# Patient Record
Sex: MALE | Race: WHITE | NOT HISPANIC OR LATINO | URBAN - METROPOLITAN AREA
[De-identification: names, ages, dates, MRNs, and addresses within clinical notes are randomized per-mention and may not be internally consistent; named-entity substitution may affect disease eponyms.]

---

## 2017-09-10 ENCOUNTER — INPATIENT (INPATIENT)
Facility: HOSPITAL | Age: 37
LOS: 2 days | Discharge: HOME | DRG: 896 | End: 2017-09-13
Attending: INTERNAL MEDICINE | Admitting: HOSPITALIST
Payer: MEDICAID

## 2017-09-10 VITALS
RESPIRATION RATE: 18 BRPM | HEART RATE: 81 BPM | DIASTOLIC BLOOD PRESSURE: 104 MMHG | OXYGEN SATURATION: 95 % | TEMPERATURE: 99 F | SYSTOLIC BLOOD PRESSURE: 166 MMHG

## 2017-09-10 LAB
ALBUMIN SERPL ELPH-MCNC: 5.2 G/DL — HIGH (ref 3.3–5)
ALP SERPL-CCNC: 40 U/L — SIGNIFICANT CHANGE UP (ref 40–120)
ALT FLD-CCNC: 19 U/L RC — SIGNIFICANT CHANGE UP (ref 10–45)
ANION GAP SERPL CALC-SCNC: 17 MMOL/L — SIGNIFICANT CHANGE UP (ref 5–17)
APPEARANCE UR: CLEAR — SIGNIFICANT CHANGE UP
AST SERPL-CCNC: 23 U/L — SIGNIFICANT CHANGE UP (ref 10–40)
BASOPHILS # BLD AUTO: 0.1 K/UL — SIGNIFICANT CHANGE UP (ref 0–0.2)
BASOPHILS NFR BLD AUTO: 0.7 % — SIGNIFICANT CHANGE UP (ref 0–2)
BILIRUB SERPL-MCNC: 0.8 MG/DL — SIGNIFICANT CHANGE UP (ref 0.2–1.2)
BILIRUB UR-MCNC: NEGATIVE — SIGNIFICANT CHANGE UP
BUN SERPL-MCNC: 13 MG/DL — SIGNIFICANT CHANGE UP (ref 7–23)
CALCIUM SERPL-MCNC: 9.8 MG/DL — SIGNIFICANT CHANGE UP (ref 8.4–10.5)
CHLORIDE SERPL-SCNC: 100 MMOL/L — SIGNIFICANT CHANGE UP (ref 96–108)
CO2 SERPL-SCNC: 25 MMOL/L — SIGNIFICANT CHANGE UP (ref 22–31)
COLOR SPEC: YELLOW — SIGNIFICANT CHANGE UP
CREAT SERPL-MCNC: 0.86 MG/DL — SIGNIFICANT CHANGE UP (ref 0.5–1.3)
DIFF PNL FLD: NEGATIVE — SIGNIFICANT CHANGE UP
EOSINOPHIL # BLD AUTO: 0 K/UL — SIGNIFICANT CHANGE UP (ref 0–0.5)
EOSINOPHIL NFR BLD AUTO: 0.1 % — SIGNIFICANT CHANGE UP (ref 0–6)
ETHANOL SERPL-MCNC: SIGNIFICANT CHANGE UP MG/DL (ref 0–10)
GAS PNL BLDV: SIGNIFICANT CHANGE UP
GLUCOSE SERPL-MCNC: 112 MG/DL — HIGH (ref 70–99)
GLUCOSE UR QL: NEGATIVE — SIGNIFICANT CHANGE UP
HCT VFR BLD CALC: 44.6 % — SIGNIFICANT CHANGE UP (ref 39–50)
HGB BLD-MCNC: 15.5 G/DL — SIGNIFICANT CHANGE UP (ref 13–17)
KETONES UR-MCNC: ABNORMAL
LEUKOCYTE ESTERASE UR-ACNC: NEGATIVE — SIGNIFICANT CHANGE UP
LYMPHOCYTES # BLD AUTO: 1.3 K/UL — SIGNIFICANT CHANGE UP (ref 1–3.3)
LYMPHOCYTES # BLD AUTO: 14.8 % — SIGNIFICANT CHANGE UP (ref 13–44)
MCHC RBC-ENTMCNC: 31 PG — SIGNIFICANT CHANGE UP (ref 27–34)
MCHC RBC-ENTMCNC: 34.7 GM/DL — SIGNIFICANT CHANGE UP (ref 32–36)
MCV RBC AUTO: 89.4 FL — SIGNIFICANT CHANGE UP (ref 80–100)
MONOCYTES # BLD AUTO: 0.7 K/UL — SIGNIFICANT CHANGE UP (ref 0–0.9)
MONOCYTES NFR BLD AUTO: 8.4 % — SIGNIFICANT CHANGE UP (ref 2–14)
NEUTROPHILS # BLD AUTO: 6.8 K/UL — SIGNIFICANT CHANGE UP (ref 1.8–7.4)
NEUTROPHILS NFR BLD AUTO: 76 % — SIGNIFICANT CHANGE UP (ref 43–77)
NITRITE UR-MCNC: NEGATIVE — SIGNIFICANT CHANGE UP
PCP SPEC-MCNC: SIGNIFICANT CHANGE UP
PH UR: 6 — SIGNIFICANT CHANGE UP (ref 5–8)
PLATELET # BLD AUTO: 196 K/UL — SIGNIFICANT CHANGE UP (ref 150–400)
POTASSIUM SERPL-MCNC: 3.8 MMOL/L — SIGNIFICANT CHANGE UP (ref 3.5–5.3)
POTASSIUM SERPL-SCNC: 3.8 MMOL/L — SIGNIFICANT CHANGE UP (ref 3.5–5.3)
PROT SERPL-MCNC: 8.4 G/DL — HIGH (ref 6–8.3)
PROT UR-MCNC: 30 MG/DL
RBC # BLD: 5 M/UL — SIGNIFICANT CHANGE UP (ref 4.2–5.8)
RBC # FLD: 12.4 % — SIGNIFICANT CHANGE UP (ref 10.3–14.5)
RBC CASTS # UR COMP ASSIST: SIGNIFICANT CHANGE UP /HPF (ref 0–2)
SALICYLATES SERPL-MCNC: 2.3 MG/DL — LOW (ref 15–30)
SODIUM SERPL-SCNC: 142 MMOL/L — SIGNIFICANT CHANGE UP (ref 135–145)
SP GR SPEC: 1.03 — HIGH (ref 1.01–1.02)
UROBILINOGEN FLD QL: 2
WBC # BLD: 8.9 K/UL — SIGNIFICANT CHANGE UP (ref 3.8–10.5)
WBC # FLD AUTO: 8.9 K/UL — SIGNIFICANT CHANGE UP (ref 3.8–10.5)
WBC UR QL: SIGNIFICANT CHANGE UP /HPF (ref 0–5)

## 2017-09-10 PROCEDURE — 99285 EMERGENCY DEPT VISIT HI MDM: CPT | Mod: 25

## 2017-09-10 PROCEDURE — 71020: CPT | Mod: 26

## 2017-09-10 PROCEDURE — 70450 CT HEAD/BRAIN W/O DYE: CPT | Mod: 26

## 2017-09-10 PROCEDURE — 93010 ELECTROCARDIOGRAM REPORT: CPT

## 2017-09-10 RX ORDER — HALOPERIDOL DECANOATE 100 MG/ML
5 INJECTION INTRAMUSCULAR ONCE
Qty: 0 | Refills: 0 | Status: COMPLETED | OUTPATIENT
Start: 2017-09-10 | End: 2017-09-10

## 2017-09-10 RX ORDER — HALOPERIDOL DECANOATE 100 MG/ML
5 INJECTION INTRAMUSCULAR ONCE
Qty: 0 | Refills: 0 | Status: DISCONTINUED | OUTPATIENT
Start: 2017-09-10 | End: 2017-09-10

## 2017-09-10 RX ORDER — SODIUM CHLORIDE 9 MG/ML
1000 INJECTION INTRAMUSCULAR; INTRAVENOUS; SUBCUTANEOUS ONCE
Qty: 0 | Refills: 0 | Status: COMPLETED | OUTPATIENT
Start: 2017-09-10 | End: 2017-09-10

## 2017-09-10 RX ORDER — VANCOMYCIN HCL 1 G
1000 VIAL (EA) INTRAVENOUS ONCE
Qty: 0 | Refills: 0 | Status: COMPLETED | OUTPATIENT
Start: 2017-09-10 | End: 2017-09-11

## 2017-09-10 RX ORDER — AMPICILLIN TRIHYDRATE 250 MG
2 CAPSULE ORAL ONCE
Qty: 0 | Refills: 0 | Status: COMPLETED | OUTPATIENT
Start: 2017-09-10 | End: 2017-09-10

## 2017-09-10 RX ORDER — CEFTRIAXONE 500 MG/1
2 INJECTION, POWDER, FOR SOLUTION INTRAMUSCULAR; INTRAVENOUS ONCE
Qty: 0 | Refills: 0 | Status: COMPLETED | OUTPATIENT
Start: 2017-09-10 | End: 2017-09-10

## 2017-09-10 RX ADMIN — SODIUM CHLORIDE 3000 MILLILITER(S): 9 INJECTION INTRAMUSCULAR; INTRAVENOUS; SUBCUTANEOUS at 20:30

## 2017-09-10 RX ADMIN — CEFTRIAXONE 100 GRAM(S): 500 INJECTION, POWDER, FOR SOLUTION INTRAMUSCULAR; INTRAVENOUS at 23:54

## 2017-09-10 RX ADMIN — Medication 2 MILLIGRAM(S): at 22:10

## 2017-09-10 RX ADMIN — Medication 2 MILLIGRAM(S): at 22:00

## 2017-09-10 RX ADMIN — HALOPERIDOL DECANOATE 5 MILLIGRAM(S): 100 INJECTION INTRAMUSCULAR at 22:00

## 2017-09-10 RX ADMIN — HALOPERIDOL DECANOATE 5 MILLIGRAM(S): 100 INJECTION INTRAMUSCULAR at 22:10

## 2017-09-10 NOTE — ED ADULT TRIAGE NOTE - CHIEF COMPLAINT QUOTE
As per mom, pt has been acting differently and hallucinating. Pt admits multi drug use, last use was this morning, has been having problem for about 10years

## 2017-09-10 NOTE — ED ADULT NURSE NOTE - CHIEF COMPLAINT QUOTE
As per mom, pt has been acting differently and hallucinating. Pt admits multi drug use, last use was this morning, has been having problem for about 10years  NO SI/HI

## 2017-09-10 NOTE — ED ADULT NURSE REASSESSMENT NOTE - NS ED NURSE REASSESS COMMENT FT1
Patient has episode of yelling and screaming. Flight of thoughts. Total od 4mg ativan and 10mg haldol administered in order to calm down patient. Patient placed on end tidal CO2. Patient completely undressed and placed on 1:1. Patient has episode of yelling and screaming. Flight of thoughts. Staff attempt to verbally deescalate situation, attempt unsuccessful. Patient become agitated and start to get out of bed.  MD aware verbal deescalation unsuccessful, order medication. First 5mg haldol and 2mg ativan administered, behavior unchanged. Second dose of 5mg haldol and 2 mg ativan administered. Patient slowly calm down. Patient placed on end tidal CO2. Patient completely undressed and placed on 1:1.

## 2017-09-10 NOTE — ED ADULT NURSE NOTE - OBJECTIVE STATEMENT
37y male c/o AMS. Hx of heroin use. Patient has been taking saboxone and snorting heroin he has been buying off the street. Patient last used at 10am. Patient speech jumbled. Denies ETOH use. States hallucinations and tremors. Patient diaphoretic. Sober 2 weeks. 37y male c/o AMS. A&Ox2, neuro intact, VSS. Hx of heroin use. Patient has been taking saboxone and snorting heroin he has been buying off the street. Patient last used at 10am. Patient speech jumbled, cannot maintain direction of thought. Denies ETOH use. States auditory hallucinations and tremors. Sober 2 weeks. Denies SI and HI. Upon arrival patient is diaphoretic and intermittently tachycardic. Denies chest pain, sob, fever/chills, n/v/d.

## 2017-09-10 NOTE — ED PROVIDER NOTE - MEDICAL DECISION MAKING DETAILS
37 M with heroin abuse, brought in by family for hallucinations today. VSS. Aox2. Likely substance abuse related but will check labs, tox, ct head, ekg, iv hydration and reassess

## 2017-09-10 NOTE — ED PROVIDER NOTE - PROGRESS NOTE DETAILS
morad: patient suddenly became agitated, screaming and hallucinating. Ativan 2, haldol 5 ordered, with little response. Another 2 and 5 ordered no source for infection rectal 100.2 will fu with LP admit MoraD: attempted LP, patient moving and agitated, will attempt with sedation

## 2017-09-10 NOTE — ED PROVIDER NOTE - OBJECTIVE STATEMENT
37 M, h/o drug use in the past (heroin, suboxone, had not used for a few months) brought in by family for hallucinations and tangential talk today. Patient says used heroin this morning, denies other ingestants. denies SI/HI. Denies HA, Cp, SOB, abd pain, n,v,d. Currently denying auditory / visual hallucinations.

## 2017-09-10 NOTE — ED PROVIDER NOTE - ATTENDING CONTRIBUTION TO CARE
Dr. Sage : I have personally seen and examined this patient at the bedside. I have fully participated in the care of this patient. I have reviewed all pertinent clinical information, including history, physical exam, plan and the Resident's note and agree except as noted.   38yo M hx of drug abuse p/w AMS as per family. pt admits to using heroin daily last used 14hrs ago. denies other drugs no etoh intake. notes that has had more frequent urination today. no head trauma   pt notes that he has been trying to taper himself off of heroin and has been using less than usual.  Denies f/c/n/v/cp/sob/palpitations/cough/abd.pain/d/c/dysuria/hematuria. no sick contacts/recent travel.    PE:  head; atraumatic normocephalic  eyes: perrla eomi  Heart: rrr s1s2  lungs: ctab  abd: soft, nt nd + bs no rebound/guarding no cva ttp  le: no swelling no calf ttp  back: no midline cervical/thoracic/lumbar ttp  neuro: aao x 1 (does not know the date thinks its July) cn ii-xii intact    -->ams--heroin withdrawal vs infectious iteology vs electrolyte imbalance --vs drugs will fu labs ct head to ro bleed; fluids;--reassess ; detox

## 2017-09-11 DIAGNOSIS — G93.40 ENCEPHALOPATHY, UNSPECIFIED: ICD-10-CM

## 2017-09-11 DIAGNOSIS — R50.9 FEVER, UNSPECIFIED: ICD-10-CM

## 2017-09-11 DIAGNOSIS — F19.90 OTHER PSYCHOACTIVE SUBSTANCE USE, UNSPECIFIED, UNCOMPLICATED: ICD-10-CM

## 2017-09-11 DIAGNOSIS — Z29.9 ENCOUNTER FOR PROPHYLACTIC MEASURES, UNSPECIFIED: ICD-10-CM

## 2017-09-11 DIAGNOSIS — R41.82 ALTERED MENTAL STATUS, UNSPECIFIED: ICD-10-CM

## 2017-09-11 DIAGNOSIS — R41.0 DISORIENTATION, UNSPECIFIED: ICD-10-CM

## 2017-09-11 LAB
ALBUMIN SERPL ELPH-MCNC: 4 G/DL — SIGNIFICANT CHANGE UP (ref 3.3–5)
ALP SERPL-CCNC: 30 U/L — LOW (ref 40–120)
ALT FLD-CCNC: 17 U/L RC — SIGNIFICANT CHANGE UP (ref 10–45)
ANION GAP SERPL CALC-SCNC: 12 MMOL/L — SIGNIFICANT CHANGE UP (ref 5–17)
APPEARANCE CSF: CLEAR — SIGNIFICANT CHANGE UP
APPEARANCE SPUN FLD: COLORLESS — SIGNIFICANT CHANGE UP
APTT BLD: 29 SEC — SIGNIFICANT CHANGE UP (ref 27.5–37.4)
AST SERPL-CCNC: 24 U/L — SIGNIFICANT CHANGE UP (ref 10–40)
BASOPHILS # BLD AUTO: 0 K/UL — SIGNIFICANT CHANGE UP (ref 0–0.2)
BASOPHILS NFR BLD AUTO: 0.6 % — SIGNIFICANT CHANGE UP (ref 0–2)
BILIRUB SERPL-MCNC: 0.8 MG/DL — SIGNIFICANT CHANGE UP (ref 0.2–1.2)
BUN SERPL-MCNC: 10 MG/DL — SIGNIFICANT CHANGE UP (ref 7–23)
CALCIUM SERPL-MCNC: 8.6 MG/DL — SIGNIFICANT CHANGE UP (ref 8.4–10.5)
CHLORIDE SERPL-SCNC: 106 MMOL/L — SIGNIFICANT CHANGE UP (ref 96–108)
CK SERPL-CCNC: 275 U/L — HIGH (ref 30–200)
CO2 SERPL-SCNC: 23 MMOL/L — SIGNIFICANT CHANGE UP (ref 22–31)
COLOR CSF: SIGNIFICANT CHANGE UP
CREAT SERPL-MCNC: 0.6 MG/DL — SIGNIFICANT CHANGE UP (ref 0.5–1.3)
EOSINOPHIL # BLD AUTO: 0 K/UL — SIGNIFICANT CHANGE UP (ref 0–0.5)
EOSINOPHIL NFR BLD AUTO: 0.3 % — SIGNIFICANT CHANGE UP (ref 0–6)
GLUCOSE CSF-MCNC: 81 MG/DL — HIGH (ref 40–70)
GLUCOSE SERPL-MCNC: 122 MG/DL — HIGH (ref 70–99)
GRAM STN FLD: SIGNIFICANT CHANGE UP
HCT VFR BLD CALC: 37.1 % — LOW (ref 39–50)
HGB BLD-MCNC: 13 G/DL — SIGNIFICANT CHANGE UP (ref 13–17)
HIV 1 & 2 AB SERPL IA.RAPID: SIGNIFICANT CHANGE UP
INR BLD: 1.24 RATIO — HIGH (ref 0.88–1.16)
LDH CSF L TO P-CCNC: 26 U/L — SIGNIFICANT CHANGE UP
LDH FLD-CCNC: 26 U/L — SIGNIFICANT CHANGE UP
LYMPHOCYTES # BLD AUTO: 1.5 K/UL — SIGNIFICANT CHANGE UP (ref 1–3.3)
LYMPHOCYTES # BLD AUTO: 20.5 % — SIGNIFICANT CHANGE UP (ref 13–44)
LYMPHOCYTES # CSF: 71 % — SIGNIFICANT CHANGE UP (ref 40–80)
MAGNESIUM SERPL-MCNC: 2.1 MG/DL — SIGNIFICANT CHANGE UP (ref 1.6–2.6)
MCHC RBC-ENTMCNC: 31.4 PG — SIGNIFICANT CHANGE UP (ref 27–34)
MCHC RBC-ENTMCNC: 35 GM/DL — SIGNIFICANT CHANGE UP (ref 32–36)
MCV RBC AUTO: 89.6 FL — SIGNIFICANT CHANGE UP (ref 80–100)
MONOCYTES # BLD AUTO: 0.8 K/UL — SIGNIFICANT CHANGE UP (ref 0–0.9)
MONOCYTES NFR BLD AUTO: 11.5 % — SIGNIFICANT CHANGE UP (ref 2–14)
MONOS+MACROS NFR CSF: 29 % — SIGNIFICANT CHANGE UP (ref 15–45)
NEUTROPHILS # BLD AUTO: 4.8 K/UL — SIGNIFICANT CHANGE UP (ref 1.8–7.4)
NEUTROPHILS # CSF: 0 % — SIGNIFICANT CHANGE UP (ref 0–6)
NEUTROPHILS NFR BLD AUTO: 67.1 % — SIGNIFICANT CHANGE UP (ref 43–77)
NRBC NFR CSF: 3 /UL — SIGNIFICANT CHANGE UP (ref 0–5)
PHOSPHATE SERPL-MCNC: 3.3 MG/DL — SIGNIFICANT CHANGE UP (ref 2.5–4.5)
PLATELET # BLD AUTO: 149 K/UL — LOW (ref 150–400)
POTASSIUM SERPL-MCNC: 3.5 MMOL/L — SIGNIFICANT CHANGE UP (ref 3.5–5.3)
POTASSIUM SERPL-SCNC: 3.5 MMOL/L — SIGNIFICANT CHANGE UP (ref 3.5–5.3)
PROT CSF-MCNC: 38 MG/DL — SIGNIFICANT CHANGE UP (ref 15–45)
PROT SERPL-MCNC: 6.3 G/DL — SIGNIFICANT CHANGE UP (ref 6–8.3)
PROTHROM AB SERPL-ACNC: 13.5 SEC — HIGH (ref 9.8–12.7)
RBC # BLD: 4.14 M/UL — LOW (ref 4.2–5.8)
RBC # CSF: 0 /UL — SIGNIFICANT CHANGE UP (ref 0–0)
RBC # FLD: 12.3 % — SIGNIFICANT CHANGE UP (ref 10.3–14.5)
SODIUM SERPL-SCNC: 141 MMOL/L — SIGNIFICANT CHANGE UP (ref 135–145)
SPECIMEN SOURCE: SIGNIFICANT CHANGE UP
TUBE TYPE: SIGNIFICANT CHANGE UP
WBC # BLD: 7.2 K/UL — SIGNIFICANT CHANGE UP (ref 3.8–10.5)
WBC # FLD AUTO: 7.2 K/UL — SIGNIFICANT CHANGE UP (ref 3.8–10.5)

## 2017-09-11 PROCEDURE — 12345: CPT | Mod: GC,NC

## 2017-09-11 PROCEDURE — 99223 1ST HOSP IP/OBS HIGH 75: CPT | Mod: AI,GC

## 2017-09-11 PROCEDURE — 77003 FLUOROGUIDE FOR SPINE INJECT: CPT | Mod: 26

## 2017-09-11 PROCEDURE — 62270 DX LMBR SPI PNXR: CPT

## 2017-09-11 PROCEDURE — 99254 IP/OBS CNSLTJ NEW/EST MOD 60: CPT

## 2017-09-11 RX ORDER — ETOMIDATE 2 MG/ML
5 INJECTION INTRAVENOUS ONCE
Qty: 0 | Refills: 0 | Status: COMPLETED | OUTPATIENT
Start: 2017-09-11 | End: 2017-09-11

## 2017-09-11 RX ORDER — CEFTRIAXONE 500 MG/1
2 INJECTION, POWDER, FOR SOLUTION INTRAMUSCULAR; INTRAVENOUS EVERY 12 HOURS
Qty: 0 | Refills: 0 | Status: DISCONTINUED | OUTPATIENT
Start: 2017-09-11 | End: 2017-09-11

## 2017-09-11 RX ORDER — VANCOMYCIN HCL 1 G
1000 VIAL (EA) INTRAVENOUS EVERY 12 HOURS
Qty: 0 | Refills: 0 | Status: DISCONTINUED | OUTPATIENT
Start: 2017-09-11 | End: 2017-09-11

## 2017-09-11 RX ORDER — ETOMIDATE 2 MG/ML
10 INJECTION INTRAVENOUS ONCE
Qty: 0 | Refills: 0 | Status: COMPLETED | OUTPATIENT
Start: 2017-09-11 | End: 2017-09-11

## 2017-09-11 RX ORDER — AMPICILLIN TRIHYDRATE 250 MG
2 CAPSULE ORAL EVERY 6 HOURS
Qty: 0 | Refills: 0 | Status: DISCONTINUED | OUTPATIENT
Start: 2017-09-11 | End: 2017-09-11

## 2017-09-11 RX ORDER — ACYCLOVIR SODIUM 500 MG
1050 VIAL (EA) INTRAVENOUS EVERY 8 HOURS
Qty: 0 | Refills: 0 | Status: DISCONTINUED | OUTPATIENT
Start: 2017-09-11 | End: 2017-09-13

## 2017-09-11 RX ADMIN — Medication 271 MILLIGRAM(S): at 23:15

## 2017-09-11 RX ADMIN — Medication 250 MILLIGRAM(S): at 03:37

## 2017-09-11 RX ADMIN — CEFTRIAXONE 100 GRAM(S): 500 INJECTION, POWDER, FOR SOLUTION INTRAMUSCULAR; INTRAVENOUS at 12:05

## 2017-09-11 RX ADMIN — ETOMIDATE 10 MILLIGRAM(S): 2 INJECTION INTRAVENOUS at 02:50

## 2017-09-11 RX ADMIN — ETOMIDATE 5 MILLIGRAM(S): 2 INJECTION INTRAVENOUS at 02:55

## 2017-09-11 RX ADMIN — Medication 2 MILLIGRAM(S): at 12:05

## 2017-09-11 RX ADMIN — Medication 271 MILLIGRAM(S): at 14:31

## 2017-09-11 RX ADMIN — Medication 216 GRAM(S): at 03:18

## 2017-09-11 RX ADMIN — Medication 2 MILLIGRAM(S): at 08:43

## 2017-09-11 RX ADMIN — Medication 271 MILLIGRAM(S): at 05:01

## 2017-09-11 NOTE — H&P ADULT - ASSESSMENT
37 M, h/o drug use in the past (heroin, suboxone, had not used for a few months) brought in by family for hallucinations and tangential talk today. 37 M, h/o heroin and suboxone use in past brought to ED by family for altered mental, tangential speech, and hallucinations while at home, found to be (+) with opiates and THC 37 M, h/o heroin and suboxone use in past brought to ED by family for acute altered mental, tangential speech, and hallucinations while at home, found to be (+) with opiates and THC admitted acute encephalopathy r/o infectious cause/meningitis, toxin/substance induced or metabolic/endocrine d/o.

## 2017-09-11 NOTE — H&P ADULT - NSHPSOCIALHISTORY_GEN_ALL_CORE
Pt lives with mother, endorses heroin use, alcohol use. Pt lives with mother, endorses heroin use, alcohol use, 20 pack year smoking history.

## 2017-09-11 NOTE — ED PROCEDURE NOTE - ATTENDING CONTRIBUTION TO CARE
LP was attempted using procedure sedation with 10mg + 5mg etomidate.  Pt previously received multiple doses of haldol and lorazepam by prior ED team.  Cardiac, SpO2 and capnography utilized.  BVM, code cart and airway tray at bedside.  Vitals frequently recorded  Complicated by myoclonus and inadequate sedation to facilitate safe LP, thus procedure aborted after resident and attending attempt.

## 2017-09-11 NOTE — H&P ADULT - NSHPLABSRESULTS_GEN_ALL_CORE
15.5   8.9   )-----------( 196      ( 10 Sep 2017 20:42 )             44.6     09-10    142  |  100  |  13  ----------------------------<  112<H>  3.8   |  25  |  0.86    Ca    9.8      10 Sep 2017 20:42    TPro  8.4<H>  /  Alb  5.2<H>  /  TBili  0.8  /  DBili  x   /  AST  23  /  ALT  19  /  AlkPhos  40  09-10      Urinalysis Basic - ( 10 Sep 2017 21:07 )    Color: Yellow / Appearance: Clear / S.028 / pH: x  Gluc: x / Ketone: Moderate  / Bili: Negative / Urobili: 2   Blood: x / Protein: 30 mg/dL / Nitrite: Negative   Leuk Esterase: Negative / RBC: 0-2 /HPF / WBC 3-5 /HPF   Sq Epi: x / Non Sq Epi: x / Bacteria: x    THC, Urine Qualitative (09.10.17 @ 21:07)    THC, Urine Qualitative: Positive: TEST REPEATED.  Opiate, Urine: Positive: TEST REPEATED. (09.10.17 @ 21:07) 15.5   8.9   )-----------( 196      ( 10 Sep 2017 20:42 )             44.6     09-10    142  |  100  |  13  ----------------------------<  112<H>  3.8   |  25  |  0.86    Ca    9.8      10 Sep 2017 20:42    TPro  8.4<H>  /  Alb  5.2<H>  /  TBili  0.8  /  DBili  x   /  AST  23  /  ALT  19  /  AlkPhos  40  10    Urinalysis Basic - ( 10 Sep 2017 21:07 )    Color: Yellow / Appearance: Clear / S.028 / pH: x  Gluc: x / Ketone: Moderate  / Bili: Negative / Urobili: 2   Blood: x / Protein: 30 mg/dL / Nitrite: Negative   Leuk Esterase: Negative / RBC: 0-2 /HPF / WBC 3-5 /HPF   Sq Epi: x / Non Sq Epi: x / Bacteria: x    THC, Urine Qualitative (09.10.17 @ 21:07)    THC, Urine Qualitative: Positive: TEST REPEATED.  Opiate, Urine: Positive: TEST REPEATED. (09.10.17 @ 21:07)    Labs, CXR, EKG were personally reviewed by me. 15.5   8.9   )-----------( 196      ( 10 Sep 2017 20:42 )             44.6     09-10    142  |  100  |  13  ----------------------------<  112<H>  3.8   |  25  |  0.86    Ca    9.8      10 Sep 2017 20:42    TPro  8.4<H>  /  Alb  5.2<H>  /  TBili  0.8  /  DBili  x   /  AST  23  /  ALT  19  /  AlkPhos  40  09-10    Urinalysis Basic - ( 10 Sep 2017 21:07 )    Color: Yellow / Appearance: Clear / S.028 / pH: x  Gluc: x / Ketone: Moderate  / Bili: Negative / Urobili: 2   Blood: x / Protein: 30 mg/dL / Nitrite: Negative   Leuk Esterase: Negative / RBC: 0-2 /HPF / WBC 3-5 /HPF   Sq Epi: x / Non Sq Epi: x / Bacteria: x    THC, Urine Qualitative (09.10.17 @ 21:07)    THC, Urine Qualitative: Positive: TEST REPEATED.  Opiate, Urine: Positive: TEST REPEATED. (09.10.17 @ 21:07)    Labs, CXR, CT head, EKG were personally reviewed by me. labs personally reviewed  CXR film personally reviewed  EKG tracing personally reviewed    15.5   8.9   )-----------( 196      ( 10 Sep 2017 20:42 )             44.6     09-10    142  |  100  |  13  ----------------------------<  112<H>  3.8   |  25  |  0.86    Ca    9.8      10 Sep 2017 20:42    TPro  8.4<H>  /  Alb  5.2<H>  /  TBili  0.8  /  DBili  x   /  AST  23  /  ALT  19  /  AlkPhos  40  09-10    Urinalysis Basic - ( 10 Sep 2017 21:07 )    Color: Yellow / Appearance: Clear / S.028 / pH: x  Gluc: x / Ketone: Moderate  / Bili: Negative / Urobili: 2   Blood: x / Protein: 30 mg/dL / Nitrite: Negative   Leuk Esterase: Negative / RBC: 0-2 /HPF / WBC 3-5 /HPF   Sq Epi: x / Non Sq Epi: x / Bacteria: x    THC, Urine Qualitative (09.10.17 @ 21:07)    THC, Urine Qualitative: Positive: TEST REPEATED.  Opiate, Urine: Positive: TEST REPEATED. (09.10.17 @ 21:07)    Labs, CXR, CT head, EKG were personally reviewed by me.

## 2017-09-11 NOTE — H&P ADULT - PROBLEM SELECTOR PLAN 2
- plan as above for "fever" - plan as above for delirium - psych and social work consults once mental status improves.

## 2017-09-11 NOTE — PROGRESS NOTE ADULT - SUBJECTIVE AND OBJECTIVE BOX
Clinical Indication: Altered mental status    PREPROCEDURE:    Patient presents for diagnostic lumbar puncture under anesthesia sedation, as patient could not tolerate.  Risks and benefits were discussed with patient and/or health care proxy.  Risks include but are not limited to headache, bleeding, infection and nerve damage.    Patient and/or health care proxy understands and consents to procedure.    POSTPROCEDURE:    Lumbar puncture was performed at the L2-3  level using a 20 gauge needle using fluoroscopic guidance. 18  cc of CSF  was collected and hand delivered to the lab    Patient tolerated the procedure well and left the department in stable condition to recover in the PACU.

## 2017-09-11 NOTE — H&P ADULT - HISTORY OF PRESENT ILLNESS
37 M, h/o drug use in the past (heroin, suboxone, had not used for a few months) brought in by family for hallucinations and tangential talk today. Patient says used heroin this morning, denies other ingestants. denies SI/HI. Denies HA, Cp, SOB, abd pain, n,v,d. Currently denying auditory / visual hallucinations. Pt remains with altered mental status, attempts to reach family via phone were unsuccessful, left voicemail to call back, history as per chart review.     37 M, h/o drug use in the past (heroin, suboxone, had not used for a few months) brought in by family for hallucinations and tangential talk today.     Pt not reliably endorsing or denying any symptoms at time of interview, pt most of time not answering questions appropriately, occasional unintelligible mumbling, however was able to reorient for brief periods, during which pt was able to state that last heroin use with 24-48 hours ago. Denied headache, photosensitivity, CP, SOB, abdom pain, SI/HI, auditory / visual hallucinations.

## 2017-09-11 NOTE — ED ADULT NURSE REASSESSMENT NOTE - NS ED NURSE REASSESS COMMENT FT1
At 1:30AM, Fracisco PRUETT attempt lumbar puncture to r/o meningitis. Patient restless, moving around too much for MD to successfully perform LP. Second attempt at LP at 0250AM. Vani PRUETT administer etomidate 10mg, then 5mg. Conscious sedation flowsheet in chart. Vitals monitored. Second LP still unsuccessful. Patient admitted to med surg. RTM. Plan to go to IR to receive LP inpatient. Antibiotics administered for meningitis workup. 1:1 still in effect. VSS

## 2017-09-11 NOTE — CONSULT NOTE ADULT - SUBJECTIVE AND OBJECTIVE BOX
ID CONSULTATION--Pierce Fall MD  Pager [phone]    Patient is a 37y old  Male who presents with a chief complaint of 37M brought in by mother for hallucinations for several hours. (11 Sep 2017 05:44)    HPI:  Pt remains with altered mental status, --the history was obtained from the patient's mother---in the ER.  37 M, h/o drug use in the past (heroin, suboxone, had not used for a few months) brought in by family for hallucinations and tangential talk .     Pt not reliably reporting or denying any symptoms at time of interview, pt most of time not answering questions appropriately, occasional unintelligible mumbling, however was able to reorient for brief periods, during which pt was able to state that last heroin use with 24-48 hours ago. Denied headache, photosensitivity, CP, SOB, abdom pain, SI/HI, auditory / visual hallucinations. (11 Sep 2017 05:44)      PAST MEDICAL & SURGICAL HISTORY:  Drug use  No significant past surgical history    SOCIAL: + heroin....no ETOH per mother--not working....lives in [place].....no travel.    FAMILY HISTORY:  No pertinent family history in first degree relatives    REVIEW OF SYSTEMS--unreliable    Allergic/Immunologic:	No hives or rash     Allergies  No Known Allergies    ANTIMICROBIALS:    acyclovir IVPB 1050 milliGRAM(s) IV Intermittent every 8 hours  vancomycin  IVPB 1000 milliGRAM(s) IV Intermittent every 12 hours  cefTRIAXone   IVPB 2 Gram(s) IV Intermittent every 12 hours      Vital Signs Last 24 Hrs  T(C): 36.3 (11 Sep 2017 09:12), Max: 37.9 (10 Sep 2017 23:09)  T(F): 97.4 (11 Sep 2017 09:12), Max: 100.2 (10 Sep 2017 23:09)  HR: 76 (11 Sep 2017 09:12) (72 - 93)  BP: 143/62 (11 Sep 2017 09:12) (114/79 - 166/104)  BP(mean): --  RR: 20 (11 Sep 2017 09:12) (18 - 20)  SpO2: 100% (11 Sep 2017 09:12) (95% - 100%)    PHYSICAL EXAM:agitated---delirious  No cachexia   Eyes:PERRL EOMI.NO discharge or conjunctival injection  ENMT:No sinus tenderness.No thrush.No pharyngeal exudate or erythema.Fair dental hygiene  Neck:supple,No LN,no JVD  Respiratory:Good air entry bilaterally,CTA  Cardiovascular:S1 S2 wnl, No murmurs,rub or gallops  Gastrointestinal:Soft BS(+) no tenderness no masses ,No rebound or guarding  Genitourinary:No CVA tendereness   Extremities:No cyanosis,clubbing or edema.  Neurological:AAO X 3,No grossly focal deficits  Lymph Nodes:No palpable LNs  Musculoskeletal:No joint swelling or LOM  Psychiatric:Affect--delirium                     13.0   7.2   )-----------( 149      ( 11 Sep 2017 06:56 )             37.1     09-11    141  |  106  |  10  ----------------------------<  122<H>  3.5   |  23  |  0.60    Ca    8.6      11 Sep 2017 06:56  Phos  3.3     09-11  Mg     2.1     09-11    TPro  6.3  /  Alb  4.0  /  TBili  0.8  /  DBili  x   /  AST  24  /  ALT  17  /  AlkPhos  30<L>  09-11      RADIOLOGY:  CT brain no acute changes  CXR no Pna    IMPRESSION:  Altered MS---Normal WBC count, no fevers, neck supple.  I am less suspicious of bacterial meningitis.  While a viral encephalitis (including WNV and HSV) would be a consideration, I am also less likely of this as a diagnosis.  The lack of fevers argue against this.  However, spinal fluid would of course be needed to definitively exlude encephalitis.  [place] encephalitis in a young individual is fairly rare in the absence of an immunocompromising state---far more common to see meningitis or simply a febrile illness.    Rx:   continue the acyclovir pending CSF  check CSF panel and additionally check cultures, and [place] IgG and IgM in CSF and serum.    I'm far less convinced for the indication of vanco + ceftriaxone.    Please DC the isolation.---I will contact infection control.

## 2017-09-11 NOTE — H&P ADULT - NSHPPHYSICALEXAM_GEN_ALL_CORE
Vital Signs Last 24 Hrs  T(C): 36.9 (11 Sep 2017 02:20), Max: 37.9 (10 Sep 2017 23:09)  T(F): 98.4 (11 Sep 2017 02:20), Max: 100.2 (10 Sep 2017 23:09)  HR: 76 (11 Sep 2017 04:35) (72 - 93)  BP: 141/62 (11 Sep 2017 04:35) (121/93 - 166/104)  BP(mean): --  RR: 20 (11 Sep 2017 04:35) (18 - 20)  SpO2: 100% (11 Sep 2017 04:35) (95% - 100%) Vital Signs Last 24 Hrs  T(C): 36.9 (11 Sep 2017 02:20), Max: 37.9 (10 Sep 2017 23:09)  T(F): 98.4 (11 Sep 2017 02:20), Max: 100.2 (10 Sep 2017 23:09)  HR: 76 (11 Sep 2017 04:35) (72 - 93)  BP: 141/62 (11 Sep 2017 04:35) (121/93 - 166/104)  BP(mean): --  RR: 20 (11 Sep 2017 04:35) (18 - 20)  SpO2: 100% (11 Sep 2017 04:35) (95% - 100%)    PHYSICAL EXAM:  GENERAL: NAD, well-developed  EYES: EOMI, PERRLA, conjunctiva and sclera clear, (-) nystagmus, possible (+) photosensitivity (eye were mostly closed when overhead light was on)  NECK: Supple, No JVD  CHEST/LUNG: Clear to auscultation bilaterally; No wheezes  HEART: Regular rate and rhythm; No murmurs, rubs, or gallops  ABDOMEN: Soft, Nontender, Nondistended; Bowel sounds present  EXTREMITIES: No lower extremity edema.   PSYCH: AAOx1-2, tangential speech.   NEUROLOGY: non-focal, (-) brudzinski, (-) kernigs  SKIN: No rashes or lesions

## 2017-09-11 NOTE — ED ADULT NURSE REASSESSMENT NOTE - NS ED NURSE REASSESS COMMENT FT1
report received from GIO Wong. Pt admitted to medicine, awaiting bed. One to one remains in place for safety. Pt. awake and restless at this time. Breathing unlabored on RA. Comfort and safety measures in place.

## 2017-09-11 NOTE — H&P ADULT - PROBLEM SELECTOR PLAN 1
- rectal temp 100.2, which although not technically a fever, when put in context for acute AMS, in setting of IVDA, could be explained by meningitis, although more likely 2/2 drug use given WBC wnl and (+) opioids and drug use history  - pt was given vanco, ampicillin, ceftriaxone, acyclovir in ED  - attempts at LP failed 2/2 agitation, pt remains agitated, c/w 1 to 1  - consider repeat LP  - c/w IVF for maintenance, social work and psych consults for drug abuse and eval for rehab once AMS improves. - rectal temp 100.2, which although not technically a fever, when put in context for acute AMS, could be explained by meningitis, although more likely a toxic metabolic encephalopathy 2/2 drug use given WBC wnl and (+) opioids and drug use history  - pt was given vanco, ampicillin, ceftriaxone, acyclovir in ED  - attempts at LP failed 2/2 agitation, pt remains agitated, c/w 1 to 1  - consider repeat LP  - CT (-) for acute pathology, CXR without evid of pathology  - c/w IVF for maintenance, social work and psych consults for drug abuse and eval for rehab once AMS improves. - rectal temp 100.2, which although not technically a fever, when put in context for acute AMS, could be explained by meningitis, although more likely a toxic metabolic encephalopathy 2/2 drug use given WBC wnl and (+) opioids and drug use history  - pt was given vanco, ampicillin, ceftriaxone, acyclovir in ED  - attempts at LP failed 2/2 agitation, pt remains agitated, c/w 1 to 1  - CT (-) for acute pathology, CXR without evid of pathology  - would recommend pursuing IR guided LP, consider ID consult.   - EtOH level (-), but would cover for withdrawal with symptom trigger CIWA  - c/w IVF for maintenance, social work and psych consults for drug abuse and eval for rehab once AMS improves. - failed LP, 2 attempts, currently on empiric Rx for meningitis since unclear cause of his acute encephalopathy.  ID eval or IR guided LP later today  - will check TSH, BCx  - possible toxin/drug induced or withdraw, will monitor on CIWA with symptom triggered ativan prn, Hx substance abuse with positive opioids and THC, EtOH neg  - If symptoms persisted, Psych eval once medically cleared

## 2017-09-11 NOTE — PROVIDER CONTACT NOTE (OTHER) - ACTION/TREATMENT ORDERED:
Team 1 night float notified; stated to continue with current regimen & notified him if CIWA score becomes greater than 12.

## 2017-09-11 NOTE — PROGRESS NOTE ADULT - SUBJECTIVE AND OBJECTIVE BOX
MEDICINE ACCEPT NOTE    Samantha Mandujano MD  Medicine Team 1  Pager: [phone] (NS)/[phone] ([facility])    Mon-Fri: pager covered by day team 7am-7pm;   ***Academic conferences M-F 8am-9am & 12pm-1pm- page ONLY if URGENT or if Consultant  /Lisbeth: see chart, primary physician assigned available 7am-12pm  Sat/[place] Coverage 12pm-7pm: NS- page [phone] for Team1-4, [facility]- pager forwarded to covering Resident    For Night coverage 7pm-7am: NS: page [phone] Team 1-3, page [phone] Team4 & Care Model   CC: Patient is a 37y old  Male who presents with a chief complaint of 37M brought in by mother for hallucinations for several hours. (11 Sep 2017 05:44)    PROVIDERS:    HPI/ROS:    Allergies    No Known Allergies    Intolerances  	  PAST MEDICAL & SURGICAL HISTORY:  Drug use  No significant past surgical history    FAMILY HISTORY:  No pertinent family history in first degree relatives    SOCIAL HISTORY  OCCUPATION:  TOBACCO USE:  ALCOHOL USE:  RECREATIONAL DRUG USE:  HIGH RISK SEXUAL ACTIVITY:    MEDICATIONS:  acyclovir IVPB 1050 milliGRAM(s) IV Intermittent every 8 hours  vancomycin  IVPB 1000 milliGRAM(s) IV Intermittent every 12 hours  cefTRIAXone   IVPB 2 Gram(s) IV Intermittent every 12 hours  LORazepam     Tablet 2 milliGRAM(s) Oral every 2 hours PRN    PHYSICAL EXAM:  T(C): 36.3 (09-11-17 @ 09:12), Max: 37.9 (09-10-17 @ 23:09)  HR: 76 (09-11-17 @ 09:12) (72 - 93)  BP: 143/62 (09-11-17 @ 09:12) (114/79 - 166/104)  RR: 20 (09-11-17 @ 09:12) (18 - 20)  SpO2: 100% (09-11-17 @ 09:12) (95% - 100%)  Wt(kg): --  Daily     Daily   I&O's Summary    TELEMETRY:     Appearance: NAD	  HEENT:   Normal oral mucosa, PERRL, EOMI	  Lymphatic: No lymphadenopathy  Cardiovascular: Normal S1 S2, No JVD, No murmurs, No edema  Respiratory: Lungs clear to auscultation	  Psychiatry: A & O x 3, Mood & affect appropriate  Gastrointestinal:  Soft, Non-tender, + BS	  Skin: No rashes, No ecchymoses, No cyanosis	  Neurologic: Non-focal  MSK/Extremities: Normal range of motion, No clubbing, cyanosis or edema  Vascular: Peripheral pulses palpable 2+ bilaterally    LABS:	 	                        13.0   7.2   )-----------( 149      ( 11 Sep 2017 06:56 )             37.1     09-11    141  |  106  |  10  ----------------------------<  122<H>  3.5   |  23  |  0.60  09-10    142  |  100  |  13  ----------------------------<  112<H>  3.8   |  25  |  0.86    Ca    8.6      11 Sep 2017 06:56  Ca    9.8      10 Sep 2017 20:42  Phos  3.3     09-11  Mg     2.1     09-11    TPro  6.3  /  Alb  4.0  /  TBili  0.8  /  DBili  x   /  AST  24  /  ALT  17  /  AlkPhos  30<L>  09-11  TPro  8.4<H>  /  Alb  5.2<H>  /  TBili  0.8  /  DBili  x   /  AST  23  /  ALT  19  /  AlkPhos  40  09-10      proBNP:   Lipid Profile:   HgA1c:   TSH:   FS: CAPILLARY BLOOD GLUCOSE        BCX/UCX:     CARDIAC MARKERS:       UA:     COAGS:  INR: 1.24 ratio (09-11-17 @ 10:18)    	    ECG:  	  RADIOLOGY: MEDICINE ACCEPT NOTE    Samantha Mandujano MD  Medicine Team 1  Pager: [phone] (NS)/[phone] ([facility])    Mon-Fri: pager covered by day team 7am-7pm;   ***Academic conferences M-F 8am-9am & 12pm-1pm- page ONLY if URGENT or if Consultant  /Lisbeth: see chart, primary physician assigned available 7am-12pm  Sat/[place] Coverage 12pm-7pm: NS- page [phone] for Team1-4, [facility]- pager forwarded to covering Resident    For Night coverage 7pm-7am: NS: page [phone] Team 1-3, page [phone] Team4 & Care Model   CC: Patient is a 37y old  Male who presents with a chief complaint hallucinations for several hours. (11 Sep 2017 05:44)    PROVIDERS:  Unable to assess    HPI/ROS: As pt still AMS when examined, unable to assess full HPI/ROS. As per chart, 38 yo M w/PMHx heroin, suboxone abuse brought in by mother for hallucinations and tangential talk. At time of exam, pt denying headaches, F/C, dizziness, syncope, CP/SOB, N/V, abdominal pain, dysuria, changes in BM, peripheral swelling, skin changes. Pt endorses lower back pain, reports used 1/2 bag heroin yesterday AM, and feels "better" than he did. Otherwise not responding appropriately to verbal questioning. Denies AV hallucinations, neck pain, photophobia.    In the ED, pt vitals were:  T=37.8 HR=78 NH=071/97 RR=20 H7DGV=864% RA  There, LP was attempted in ER but unable to be completed.    Allergies  No Known Allergies  Intolerances  	  PAST MEDICAL & SURGICAL HISTORY:  Drug use  No significant past surgical history    FAMILY HISTORY:  No pertinent family history in first degree relatives    SOCIAL HISTORY  OCCUPATION: works in "social media"  TOBACCO USE: did not respond  ALCOHOL USE: endorses social use  RECREATIONAL DRUG USE: uses heroin, suboxone, and "pain pills"  HIGH RISK SEXUAL ACTIVITY: denies    MEDICATIONS:  acyclovir IVPB 1050 milliGRAM(s) IV Intermittent every 8 hours  vancomycin  IVPB 1000 milliGRAM(s) IV Intermittent every 12 hours  cefTRIAXone   IVPB 2 Gram(s) IV Intermittent every 12 hours  LORazepam     Tablet 2 milliGRAM(s) Oral every 2 hours PRN    PHYSICAL EXAM:  T(C): 36.3 (09-11-17 @ 09:12), Max: 37.9 (09-10-17 @ 23:09)  HR: 76 (09-11-17 @ 09:12) (72 - 93)  BP: 143/62 (09-11-17 @ 09:12) (114/79 - 166/104)  RR: 20 (09-11-17 @ 09:12) (18 - 20)  SpO2: 100% (09-11-17 @ 09:12) (95% - 100%)  Wt(kg): --  Daily     Daily   I&O's Summary    Appearance: young white male lying in bed in NAD, occasionally muttering unintelligible speech	  HEENT:  Normal oral mucosa, PERRL, EOMI, no nystagmus	  Lymphatic: No lymphadenopathy  Cardiovascular: Normal S1 S2, No JVD, No murmurs, No edema  Respiratory: Lungs clear to auscultation	  Psychiatry: A & O x 3, Mood & affect appropriate  Gastrointestinal:  Soft, Non-tender, + BS	  Skin: No rashes, No ecchymoses, No cyanosis	  Neurologic: Non-focal, CN 2-12 grossly intact, negative Kernig/Brudzinski sign  MSK/Extremities: No nuchal rigidity, No clubbing, cyanosis or edema  Vascular: Peripheral pulses palpable 2+ bilaterally    LABS:	 	                        13.0   7.2   )-----------( 149      ( 11 Sep 2017 06:56 )             37.1     09-11    141  |  106  |  10  ----------------------------<  122<H>  3.5   |  23  |  0.60  09-10    142  |  100  |  13  ----------------------------<  112<H>  3.8   |  25  |  0.86    Ca    8.6      11 Sep 2017 06:56  Ca    9.8      10 Sep 2017 20:42  Phos  3.3     09-11  Mg     2.1     09-11    TPro  6.3  /  Alb  4.0  /  TBili  0.8  /  DBili  x   /  AST  24  /  ALT  17  /  AlkPhos  30<L>  09-11  TPro  8.4<H>  /  Alb  5.2<H>  /  TBili  0.8  /  DBili  x   /  AST  23  /  ALT  19  /  AlkPhos  40  09-10    Urinalysis + Microscopic Examination (09.10.17 @ 21:07)    Glucose Qualitative, Urine: Negative    Blood, Urine: Negative    Urine Appearance: Clear    Specific Gravity: 1.028    Ketone - Urine: Moderate    Urobilinogen: 2    Bilirubin: Negative    Protein, Urine: 30 mg/dL    Nitrite: Negative    pH Urine: 6.0    Leukocyte Esterase Concentration: Negative    Color: Yellow    Red Blood Cell - Urine: 0-2 /HPF    White Blood Cell - Urine: 3-5 /HPF    Utox: positive for THC, opiates    COAGS:  Prothrombin Time and INR, Plasma (09.11.17 @ 10:18)    Prothrombin Time, Plasma: 13.5: Effective March 21st, the reference range for PT has changed. sec    INR: 1.24: RECOMMENDED RANGES FOR THERAPEUTIC INR:    2.0-3.0 for most medical and surgical thromboembolic states    2.0-3.0 for atrial fibrillation  THC, Urine Qualitative: Positive: TEST REPEATED. (09.10.17 @ 21:07)    THC, Urine Qualitative (09.10.17 @ 21:07)    THC, Urine Qualitative: Positive: TEST REPEATED.      2.0-3.0 for bileaflet mechanical valve in aortic position    2.5-3.5 for mechanical heart valves   Chest 2004;1.24: 126:S227-385  The presence of direct thrombin inhibitors (argatroban, refludan)  may falsely increase results. ratio    HIV: neg  Blood Gas VenousBlood Gas Venous - Lactate (09.10.17 @ 23:48)    Blood Gas Venous - Lactate: 1.1 mmoL/L    ECG:  	  Ventricular Rate 90 BPM    Atrial Rate 90 BPM    P-R Interval 142 ms    QRS Duration 82 ms     ms    QTc 445 ms    P Axis 66 degrees    R Axis 86 degrees    T Axis 70 degrees    Diagnosis Line NORMAL SINUS RHYTHM  NORMAL ECG MEDICINE ACCEPT NOTE    Samanhta Mandujano MD  Medicine Team 1  Pager: [phone] (NS)/[phone] ([facility])    Mon-Fri: pager covered by day team 7am-7pm;   ***Academic conferences M-F 8am-9am & 12pm-1pm- page ONLY if URGENT or if Consultant  /Lisbeth: see chart, primary physician assigned available 7am-12pm  Sat/[place] Coverage 12pm-7pm: NS- page [phone] for Team1-4, [facility]- pager forwarded to covering Resident    For Night coverage 7pm-7am: NS: page [phone] Team 1-3, page [phone] Team4 & Care Model   CC: Patient is a 37y old  Male who presents with a chief complaint hallucinations for several hours. (11 Sep 2017 05:44)    PROVIDERS:  Unable to assess    HPI/ROS: As pt still AMS when examined, unable to assess full HPI/ROS. As per chart, 38 yo M w/PMHx heroin, suboxone abuse brought in by mother for hallucinations and tangential talk. At time of exam, pt denying headaches, F/C, dizziness, syncope, CP/SOB, N/V, abdominal pain, dysuria, changes in BM, peripheral swelling, skin changes. Pt endorses lower back pain, reports used 1/2 bag heroin yesterday AM, and feels "better" than he did. Otherwise not responding appropriately to verbal questioning. Denies AV hallucinations, neck pain, photophobia.    In the ED, pt vitals were:  T=37.8 HR=78 MU=944/97 RR=20 Q0XCI=719% RA  There, LP was attempted in ER but unable to be completed.    Allergies  No Known Allergies  Intolerances  	  PAST MEDICAL & SURGICAL HISTORY:  Drug use  No significant past surgical history    FAMILY HISTORY:  No pertinent family history in first degree relatives    SOCIAL HISTORY  OCCUPATION: works in "social media"  TOBACCO USE: did not respond  ALCOHOL USE: endorses social use  RECREATIONAL DRUG USE: uses heroin, suboxone, and "pain pills"  HIGH RISK SEXUAL ACTIVITY: denies    MEDICATIONS:  acyclovir IVPB 1050 milliGRAM(s) IV Intermittent every 8 hours  vancomycin  IVPB 1000 milliGRAM(s) IV Intermittent every 12 hours  cefTRIAXone   IVPB 2 Gram(s) IV Intermittent every 12 hours  LORazepam     Tablet 2 milliGRAM(s) Oral every 2 hours PRN    PHYSICAL EXAM:  T(C): 36.3 (09-11-17 @ 09:12), Max: 37.9 (09-10-17 @ 23:09)  HR: 76 (09-11-17 @ 09:12) (72 - 93)  BP: 143/62 (09-11-17 @ 09:12) (114/79 - 166/104)  RR: 20 (09-11-17 @ 09:12) (18 - 20)  SpO2: 100% (09-11-17 @ 09:12) (95% - 100%)  Wt(kg): --  Daily     Daily   I&O's Summary    Appearance: young white male lying in bed in NAD, occasionally muttering unintelligible speech	  HEENT:  Normal oral mucosa, PERRL, EOMI, no nystagmus	  Lymphatic: No lymphadenopathy  Cardiovascular: Normal S1 S2, No JVD, No murmurs, No edema  Respiratory: Lungs clear to auscultation	  Gastrointestinal:  Soft, Non-tender, + BS	  Skin: No rashes, No ecchymoses, No cyanosis	  Neurologic: Non-focal, CN 2-12 grossly intact, negative Kernig/Brudzinski sign  MSK/Extremities: No nuchal rigidity, No clubbing, cyanosis or edema  Vascular: Peripheral pulses palpable 2+ bilaterally    LABS:	 	                        13.0   7.2   )-----------( 149      ( 11 Sep 2017 06:56 )             37.1     09-11    141  |  106  |  10  ----------------------------<  122<H>  3.5   |  23  |  0.60  09-10    142  |  100  |  13  ----------------------------<  112<H>  3.8   |  25  |  0.86    Ca    8.6      11 Sep 2017 06:56  Ca    9.8      10 Sep 2017 20:42  Phos  3.3     09-11  Mg     2.1     09-11    TPro  6.3  /  Alb  4.0  /  TBili  0.8  /  DBili  x   /  AST  24  /  ALT  17  /  AlkPhos  30<L>  09-11  TPro  8.4<H>  /  Alb  5.2<H>  /  TBili  0.8  /  DBili  x   /  AST  23  /  ALT  19  /  AlkPhos  40  09-10    Urinalysis + Microscopic Examination (09.10.17 @ 21:07)    Glucose Qualitative, Urine: Negative    Blood, Urine: Negative    Urine Appearance: Clear    Specific Gravity: 1.028    Ketone - Urine: Moderate    Urobilinogen: 2    Bilirubin: Negative    Protein, Urine: 30 mg/dL    Nitrite: Negative    pH Urine: 6.0    Leukocyte Esterase Concentration: Negative    Color: Yellow    Red Blood Cell - Urine: 0-2 /HPF    White Blood Cell - Urine: 3-5 /HPF    Utox: positive for THC, opiates    COAGS:  Prothrombin Time and INR, Plasma (09.11.17 @ 10:18)    Prothrombin Time, Plasma: 13.5: Effective March 21st, the reference range for PT has changed. sec    INR: 1.24: RECOMMENDED RANGES FOR THERAPEUTIC INR:    2.0-3.0 for most medical and surgical thromboembolic states    2.0-3.0 for atrial fibrillation  THC, Urine Qualitative: Positive: TEST REPEATED. (09.10.17 @ 21:07)    THC, Urine Qualitative (09.10.17 @ 21:07)    THC, Urine Qualitative: Positive: TEST REPEATED.      2.0-3.0 for bileaflet mechanical valve in aortic position    2.5-3.5 for mechanical heart valves   Chest 2004;1.24: 126:[id number]  The presence of direct thrombin inhibitors (argatroban, refludan)  may falsely increase results. ratio    HIV: neg  Blood Gas VenousBlood Gas Venous - Lactate (09.10.17 @ 23:48)    Blood Gas Venous - Lactate: 1.1 mmoL/L    ECG:  	  Ventricular Rate 90 BPM    Atrial Rate 90 BPM    P-R Interval 142 ms    QRS Duration 82 ms     ms    QTc 445 ms    P Axis 66 degrees    R Axis 86 degrees    T Axis 70 degrees    Diagnosis Line NORMAL SINUS RHYTHM  NORMAL ECG

## 2017-09-11 NOTE — H&P ADULT - NSHPREVIEWOFSYSTEMS_GEN_ALL_CORE
Limited perhaps unreliable ROS was obtained as follows    General: Denies fever  HEENT: Denies photosensitivity  Cardio: Denies CP, palpitations  Pulm: Denies SOB  GI: Denies nausea, vomiting, abdominal pain  Neuro: Denies focal weakness, headaches  Musc Skel: Denies back pain  Skin: Denies recent rashes  Endocrine: Denies intolerance to heat or coldness  Psych: Denies anxiety and depressed mood

## 2017-09-12 DIAGNOSIS — F19.10 OTHER PSYCHOACTIVE SUBSTANCE ABUSE, UNCOMPLICATED: ICD-10-CM

## 2017-09-12 DIAGNOSIS — F11.20 OPIOID DEPENDENCE, UNCOMPLICATED: ICD-10-CM

## 2017-09-12 LAB
ANION GAP SERPL CALC-SCNC: 17 MMOL/L — SIGNIFICANT CHANGE UP (ref 5–17)
BUN SERPL-MCNC: 5 MG/DL — LOW (ref 7–23)
CALCIUM SERPL-MCNC: 8.8 MG/DL — SIGNIFICANT CHANGE UP (ref 8.4–10.5)
CHLORIDE SERPL-SCNC: 106 MMOL/L — SIGNIFICANT CHANGE UP (ref 96–108)
CO2 SERPL-SCNC: 21 MMOL/L — LOW (ref 22–31)
CREAT SERPL-MCNC: 0.68 MG/DL — SIGNIFICANT CHANGE UP (ref 0.5–1.3)
GLUCOSE SERPL-MCNC: 106 MG/DL — HIGH (ref 70–99)
HCT VFR BLD CALC: 38.1 % — LOW (ref 39–50)
HGB BLD-MCNC: 13.2 G/DL — SIGNIFICANT CHANGE UP (ref 13–17)
HIV 1+2 AB+HIV1 P24 AG SERPL QL IA: SIGNIFICANT CHANGE UP
LABORATORY COMMENT REPORT: SIGNIFICANT CHANGE UP
MCHC RBC-ENTMCNC: 29.7 PG — SIGNIFICANT CHANGE UP (ref 27–34)
MCHC RBC-ENTMCNC: 34.6 GM/DL — SIGNIFICANT CHANGE UP (ref 32–36)
MCV RBC AUTO: 85.6 FL — SIGNIFICANT CHANGE UP (ref 80–100)
NIGHT BLUE STAIN TISS: SIGNIFICANT CHANGE UP
PLATELET # BLD AUTO: 157 K/UL — SIGNIFICANT CHANGE UP (ref 150–400)
POTASSIUM SERPL-MCNC: 3.4 MMOL/L — LOW (ref 3.5–5.3)
POTASSIUM SERPL-SCNC: 3.4 MMOL/L — LOW (ref 3.5–5.3)
RBC # BLD: 4.45 M/UL — SIGNIFICANT CHANGE UP (ref 4.2–5.8)
RBC # FLD: 13.4 % — SIGNIFICANT CHANGE UP (ref 10.3–14.5)
SODIUM SERPL-SCNC: 144 MMOL/L — SIGNIFICANT CHANGE UP (ref 135–145)
SOURCE HSV 1/2: SIGNIFICANT CHANGE UP
SPECIMEN SOURCE: SIGNIFICANT CHANGE UP
WBC # BLD: 6.54 K/UL — SIGNIFICANT CHANGE UP (ref 3.8–10.5)
WBC # FLD AUTO: 6.54 K/UL — SIGNIFICANT CHANGE UP (ref 3.8–10.5)

## 2017-09-12 PROCEDURE — 99232 SBSQ HOSP IP/OBS MODERATE 35: CPT

## 2017-09-12 PROCEDURE — 99233 SBSQ HOSP IP/OBS HIGH 50: CPT | Mod: GC

## 2017-09-12 PROCEDURE — 99254 IP/OBS CNSLTJ NEW/EST MOD 60: CPT

## 2017-09-12 RX ORDER — POTASSIUM CHLORIDE 20 MEQ
20 PACKET (EA) ORAL ONCE
Qty: 0 | Refills: 0 | Status: COMPLETED | OUTPATIENT
Start: 2017-09-12 | End: 2017-09-12

## 2017-09-12 RX ADMIN — Medication 271 MILLIGRAM(S): at 05:29

## 2017-09-12 RX ADMIN — Medication 271 MILLIGRAM(S): at 21:19

## 2017-09-12 RX ADMIN — Medication 20 MILLIEQUIVALENT(S): at 10:22

## 2017-09-12 RX ADMIN — Medication 271 MILLIGRAM(S): at 15:55

## 2017-09-12 NOTE — PROGRESS NOTE ADULT - SUBJECTIVE AND OBJECTIVE BOX
Contact info:  Samantha Mandujano MD  Internal Medicine, PGY1  Pager: [phone] (NS)/[phone] ([facility])    M-F 7AM-7PM: pager covered by primary day team  Academic conferences 8AM-9AM and 12PM-1PM: please page only if urgent or consultant  Mon-Sun 7PM-7AM: Night float page [phone] for teams 1-3, 1446 for teams 4, CMA, CMB  Sa-Sun 7AM-12PM: please see contact sheet in front of chart, primary day team  Sa-Sun 12PM-7PM: Page [phone] for teams 1-3 (NS), [facility] please page covering resident    24 HOUR EVENTS/ROS: No acute overnight events. Pt mental status much improved as per RN. Now oriented and alert. Denies headaches, F/C, dizziness, syncope, CP/SOB, N/V, dysuria, peripheral swelling. Experiencing mild diarrhea, abdominal pain earlier now resolved. Denies AV hallucinations.    MEDICATIONS:  acyclovir IVPB 1050 milliGRAM(s) IV Intermittent every 8 hours  LORazepam     Tablet 2 milliGRAM(s) Oral every 2 hours PRN    PHYSICAL EXAM:  T(C): 36.4 (09-12-17 @ 14:03), Max: 37.5 (09-12-17 @ 01:30)  HR: 85 (09-12-17 @ 14:03) (57 - 101)  BP: 147/78 (09-12-17 @ 14:03) (117/64 - 159/84)  RR: 20 (09-12-17 @ 14:03) (18 - 20)  SpO2: 97% (09-12-17 @ 14:03) (97% - 100%)  Wt(kg): --  Daily     Daily   I&O's Summary    11 Sep 2017 07:01  -  12 Sep 2017 07:00  --------------------------------------------------------  IN: 1050 mL / OUT: 0 mL / NET: 1050 mL    Appearance: young white male lying in bed in NAD  HEENT:  Normal oral mucosa, PERRL, EOMI, no nystagmus	  Lymphatic: No lymphadenopathy  Cardiovascular: Normal S1 S2, No JVD, No murmurs, No edema  Respiratory: Lungs clear to auscultation	  Gastrointestinal:  Soft, Non-tender, + BS	  Skin: No rashes, No ecchymoses, No cyanosis	  Neurologic/psychiatric: Non-focal, CN 2-12 grossly intact, alert and oriented, mood and affect appropriate  MSK/Extremities: No nuchal rigidity, No clubbing, cyanosis or edema  Vascular: Peripheral pulses palpable 2+ bilaterally      LABS:	 	                        13.2   6.54  )-----------( 157      ( 12 Sep 2017 07:42 )             38.1     09-12    144  |  106  |  5<L>  ----------------------------<  106<H>  3.4<L>   |  21<L>  |  0.68  09-11    141  |  106  |  10  ----------------------------<  122<H>  3.5   |  23  |  0.60    Ca    8.8      12 Sep 2017 07:42  Ca    8.6      11 Sep 2017 06:56  Phos  3.3     09-11  Mg     2.1     09-11    TPro  6.3  /  Alb  4.0  /  TBili  0.8  /  DBili  x   /  AST  24  /  ALT  17  /  AlkPhos  30<L>  09-11  TPro  8.4<H>  /  Alb  5.2<H>  /  TBili  0.8  /  DBili  x   /  AST  23  /  ALT  19  /  AlkPhos  40  09-10    Culture - CSF with Gram Stain . (09.11.17 @ 22:07)    Gram Stain:   No polymorphonuclear cells seen  No organisms seen by cytocentrifuge    Specimen Source: .CSF CSF    CSF Lymphocytes: 71 % (09.11.17 @ 18:54)  CSF Monocytes/Macrophages: 29 % (09.11.17 @ 18:54)  Total Nucleated Cell Count, CSF: 3 /uL (09.11.17 @ 18:54)  CSF Color: No Color (09.11.17 @ 18:54)  Lactate Dehydrogenase, CSF (09.11.17 @ 18:49)    Lactate Dehydrogenase, CSF: 26: Reference Ranges have NOT been established for CSF LDH.  The  has not determined the efficacy of this test when  performed on CSF specimens. The performance characteristics of this test  were determined by [facility]. U/L  Protein, CSF: 38 mg/dL (09.11.17 @ 18:49)  Glucose, CSF: 81 mg/dL (09.11.17 @ 18:49)    Consult notes reviewed:  ID

## 2017-09-12 NOTE — BEHAVIORAL HEALTH ASSESSMENT NOTE - DIFFERENTIAL
substance-induced depressive d/o vs. dysthymia vs. depressive d/o NOS vs. MDD (less likely) opiate dependence, polysubstance abuse, substance-induced mood d/o vs. dysthymia vs. depressive d/o NOS vs. MDD (less likely)

## 2017-09-12 NOTE — BEHAVIORAL HEALTH ASSESSMENT NOTE - NSBHCONSULTMEDS_PSY_A_CORE FT
Recommendations:  1) Does not need inpatient psychiatric treatment at this time.  2) Recommend discontinuing CIWA as no suspicion for alcohol or benzodiazepine withdrawal.  3) Recommend initiating CINA for possible opiate withdrawal. Monitor for tremulousness, diaphoresis, hypertension, vomiting, diarrhea, piloerection, hallucinations, anxiety, agitation, and clouded sensorium. Use methadone 5mg q6h PRN for 2 or more symptoms of opiate withdrawal. No recommendation for standing methadone.  4) Discharge to outpatient rehab for heroin abuse when medically stable.  5) Recommend following with outpatient psychiatry.

## 2017-09-12 NOTE — BEHAVIORAL HEALTH ASSESSMENT NOTE - HPI (INCLUDE ILLNESS QUALITY, SEVERITY, DURATION, TIMING, CONTEXT, MODIFYING FACTORS, ASSOCIATED SIGNS AND SYMPTOMS)
Mr. Miguelangel De La Rosa is a 36 y/o male with no significant PMHx or PPHx endorsing regular heroin use (half a bag to 1.5 bags per day) over the past year who presented to the [facility] ED with AMS, brought in by family who described hallucinations and bizarre speech. He was found to have low-grade fever in the ED with LP attempted x 2 and unable to be performed; placed on empiric abx and acyclovir as unable to r/o meningitis and followed by ID. Today, pt states he feels "a little cloudy" but that the symptoms that brought him into the ER have resolved. He explains that he began to have "a bad reaction" after using approximately half a bag of heroin on Saturday. He describes "an empty shell kind of feeling" that is "hard to explain, like the life was sucked out of me." He heard voices but "knew they weren't real." Pt explains that his weekend was difficult personally because it was his grandfather's wake, and he revealed to his family that he has been using heroin. He states "I could see the disappointment on their faces," and that caused him to have feelings of intense shame, guilt, and worthlessness in the past few days. He endorses low mood and anhedonia for "a long time," and also endorses anxiety that he states "is part of my personality." He denies previous auditory or visual hallucinations prior to this episode on Saturday as well as delusions, paranoia, bizarre behavior, suresh, homicidal ideation or intent, suicidal ideation or intent, past history of suicide attempts, past psychiatric hospitalizations or treatment. He does state that "a few years ago" his PMD felt he might be depressed and offered him Wellbutrin. He declined to take the medication at the time. At this time, he is amenable to treatment with outpatient psychiatry for his depressive symptoms. He is eager to initiate treatment for his heroin use and wants to get "back on track." Mr. Miguelangel De La Rosa is a 36 y/o male with no significant PMHx or PPHx endorsing regular heroin use (half a bag to 1.5 bags per day) over the past year who presented to the [facility] ED with AMS, brought in by family who described hallucinations and bizarre speech. He was found to have low-grade fever in the ED with LP attempted x 2 and unable to be performed; placed on empiric abx and acyclovir as unable to r/o meningitis and followed by ID. Today, pt states he feels "a little cloudy" but that the symptoms that brought him into the ER have resolved. He explains that he began to have "a bad reaction" after using approximately half a bag of heroin on Saturday. He describes "an empty shell kind of feeling" that is "hard to explain, like the life was sucked out of me." He heard voices but "knew they weren't real." Pt explains that his weekend was difficult personally because it was his grandfather's wake, and he revealed to his family that he has been using heroin. He states "I could see the disappointment on their faces," and that caused him to have feelings of  shame, guilt, and worthlessness in the past few days. He endorses low mood and anhedonia for "a long time," and also endorses anxiety that he states "is part of my personality." He denies previous auditory or visual hallucinations prior to this episode on Saturday. denies any current or history of delusions, paranoia, bizarre behavior, suresh, homicidal ideation or intent, suicidal ideation or intent, past history of suicide attempts, past psychiatric hospitalizations or treatment. He does state that "a few years ago" his PMD felt he might be depressed and offered him Wellbutrin. He declined to take the medication at the time. At this time, he is amenable to treatment with outpatient psychiatry for his depressive symptoms. He is eager to initiate treatment for his heroin use and wants to get "back on track."

## 2017-09-12 NOTE — BEHAVIORAL HEALTH ASSESSMENT NOTE - CASE SUMMARY
Mr. Spring is a 36y/o male smoker with no significant PMHx or PPHx who has been using heroin primarily, about 2/3-1 bag a day (hx of polysubstance, benzos + THC) in the past year, brought in with AMS and treated empirically for meningitis. Pt strongly desires and requests substance use treatment. On interview, endorses low mood, anhedonia, and history of PCP suggesting anti-depressant treatment. Endorses few days of guilt, worthlessness, and shame related to revealing heroin addiction to family. Pt. doesn't have any sxs of withdrawal except a mild tremor. Denies current or past SI. Pt is amenable to outpatient treatment with psychiatry for depressive symptoms. opiate dependence, polysubstance abuse history. Symptoms not meeting criteria for MDD; dx possible substance-induced depressive d/o dysthymia vs. depressive d/o NOS;  Does not meet criteria for inpatient psychiatric hospitalization at this time. Pt doesn't have any current sxs of withdrawal from opiates. no psychiatric contraindication to discharge. 1.  D/C CIWA as no suspicion for alcohol or benzodiazepine withdrawal.  2.  Recommend initiating CINA for opiate withdrawal. Monitor for tremulousness, diaphoresis, hypertension, vomiting, diarrhea, piloerection, rhinorrhea,  hallucinations, anxiety, agitation, and clouded sensorium. Use methadone 5mg q6h PRN for 2 or more symptoms of opiate withdrawal. No recommendation for standing methadone. 3. Discharge to outpatient rehab for heroin abuse when medically stable. 4. pt can f/u with  [facility] program. would contact SW to make appropriate referrals, pt is interested in OP treatment.

## 2017-09-12 NOTE — BEHAVIORAL HEALTH ASSESSMENT NOTE - DESCRIPTION (FIRST USE, LAST USE, QUANTITY, FREQUENCY, DURATION)
Smokes a pack a day. Very rarely uses; "one beer a week or a glass of wine with dinner." No report of excessive use. Last smoked "a month and a half ago." Reports using one line a week and a half ago. Previously used "pain pills" and switched to heroin in the past year. Uses 0.5 - 1.5 bags per day. States he will occasionally use "a bar" if his friends have it, but that he has not used benzodiazepines in the past 2-3 months.

## 2017-09-12 NOTE — BEHAVIORAL HEALTH ASSESSMENT NOTE - NSBHSUICPROTECTFACT_PSY_A_CORE
Responsibility to family and others/Supportive social network or family/Fear of death or dying due to pain/suffering/Identifies reasons for living/Future oriented/Engaged in work or school

## 2017-09-12 NOTE — BEHAVIORAL HEALTH ASSESSMENT NOTE - NSBHCHARTREVIEWLAB_PSY_A_CORE FT
13.2   6.54  )-----------( 157      ( 12 Sep 2017 07:42 )             38.1   09-12    144  |  106  |  5<L>  ----------------------------<  106<H>  3.4<L>   |  21<L>  |  0.68    Ca    8.8      12 Sep 2017 07:42  Phos  3.3     09-11  Mg     2.1     09-11    TPro  6.3  /  Alb  4.0  /  TBili  0.8  /  DBili  x   /  AST  24  /  ALT  17  /  AlkPhos  30<L>  09-11

## 2017-09-12 NOTE — BEHAVIORAL HEALTH ASSESSMENT NOTE - NSBHCHARTREVIEWVS_PSY_A_CORE FT
ICU Vital Signs Last 24 Hrs  T(C): 36.4 (12 Sep 2017 14:03), Max: 37.5 (12 Sep 2017 01:30)  T(F): 97.5 (12 Sep 2017 14:03), Max: 99.5 (12 Sep 2017 01:30)  HR: 85 (12 Sep 2017 14:03) (57 - 101)  BP: 147/78 (12 Sep 2017 14:03) (117/64 - 159/84)  RR: 20 (12 Sep 2017 14:03) (18 - 20)  SpO2: 97% (12 Sep 2017 14:03) (97% - 100%)

## 2017-09-12 NOTE — PROGRESS NOTE ADULT - SUBJECTIVE AND OBJECTIVE BOX
INFECTIOUS DISEASES FOLLOW UP--Pierce Fall MD  Pager [phone]    This is a follow up note for this  37y Male with  Altered mental status--He is more oriented today and a bit more calm.  No new complaints.  No HA or neck pain.    Further ROS:  CONSTITUTIONAL:  No fever  CARDIOVASCULAR:  No chest pain or palpitations  RESPIRATORY:  No dyspnea  GASTROINTESTINAL:  No nausea, vomiting, diarrhea, or abdominal pain  GENITOURINARY:  No dysuria  NEUROLOGIC:  No headache,     Allergies  No Known Allergies    ANTIBIOTICS/RELEVANT:  antimicrobials  acyclovir IVPB 1050 milliGRAM(s) IV Intermittent every 8 hours    OTHER:  LORazepam     Tablet 2 milliGRAM(s) Oral every 2 hours PRN    Objective:  Vital Signs Last 24 Hrs  T(C): 36.8 (12 Sep 2017 07:38), Max: 37.5 (12 Sep 2017 01:30)  T(F): 98.3 (12 Sep 2017 07:38), Max: 99.5 (12 Sep 2017 01:30)  HR: 57 (12 Sep 2017 07:38) (57 - 101)  BP: 149/84 (12 Sep 2017 07:38) (117/64 - 159/84)  BP(mean): --  RR: 20 (12 Sep 2017 07:38) (18 - 20)  SpO2: 97% (12 Sep 2017 05:30) (97% - 100%)    PHYSICAL EXAM:  Constitutional:no acute distress  Eyes:SYLWIA, EOMI  Ear/Nose/Throat: no oral lesions, 	  Respiratory: clear BL  Cardiovascular: S1S2  Gastrointestinal:soft, (+) BS, no tenderness  Extremities:no e/e/c  No Lymphadenopathy  IV sites not inflammed.    LABS:                        13.2   6.54  )-----------( 157      ( 12 Sep 2017 07:42 )             38.1         141  |  106  |  10  ----------------------------<  122<H>  3.5   |  23  |  0.60    Ca    8.6      11 Sep 2017 06:56  Phos  3.3       Mg     2.1         TPro  6.3  /  Alb  4.0  /  TBili  0.8  /  DBili  x   /  AST  24  /  ALT  17  /  AlkPhos  30<L>      PT/INR - ( 11 Sep 2017 10:18 )   PT: 13.5 sec;   INR: 1.24 ratio         PTT - ( 11 Sep 2017 10:18 )  PTT:29.0 sec  Urinalysis Basic - ( 10 Sep 2017 21:07 )    Color: Yellow / Appearance: Clear / S.028 / pH: x  Gluc: x / Ketone: Moderate  / Bili: Negative / Urobili: 2   Blood: x / Protein: 30 mg/dL / Nitrite: Negative   Leuk Esterase: Negative / RBC: 0-2 /HPF / WBC 3-5 /HPF   Sq Epi: x / Non Sq Epi: x / Bacteria: x      MICROBIOLOGY:  none positive      CSF Segmented Neutrophils: 0 % ( @ 18:54)  CSF Lymphocytes: 71 % ( @ 18:54)    RADIOLOGY & ADDITIONAL STUDIES:

## 2017-09-12 NOTE — BEHAVIORAL HEALTH ASSESSMENT NOTE - RISK ASSESSMENT
Pt is overall at low risk for self-harm. His risk factors include white race, male gender, single, substance abuse, and possible mood episode. Protective factors include high engagement in treatment, future-orientedness, close/supportive relationship with family, expressed fear of death/loss of function, no expressed SI, no access to fire-arms in home. He does not meet criteria for inpatient psychiatric hospitalization at this time. No evidence of homicidality, history of aggression, or legal history: low risk of harm to others.

## 2017-09-12 NOTE — BEHAVIORAL HEALTH ASSESSMENT NOTE - SUMMARY
Mr. Spring is a 36y/o male smoker with no significant PMHx or PPHx who has been using heroin primarily (hx of polysubstance, benzos + THC) in the past year, brought in with AMS and treated empirically for meningitis. Pt strongly desires and requests substance use treatment. On interview, endorses low mood, anhedonia, and history of PCP suggesting anti-depressant treatment. Endorses few days of guilt, worthlessness, and shame related to revealing heroin addiction to family. Denies current or past SI. Pt is amenable to outpatient treatment with psychiatry for depressive symptoms. 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 Does not meet criteria for inpatient psychiatric hospitalization at this time. Mr. Spring is a 36y/o male smoker with no significant PMHx or PPHx who has been using heroin primarily (hx of polysubstance, benzos + THC) in the past year, brought in with AMS and treated empirically for meningitis. Pt strongly desires and requests substance use treatment. On interview, endorses low mood, anhedonia, and history of PCP suggesting anti-depressant treatment. Endorses few days of guilt, worthlessness, and shame related to revealing heroin addiction to family. Denies current or past SI. Pt is amenable to outpatient treatment with psychiatry for depressive symptoms. 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 Does not meet criteria for inpatient psychiatric hospitalization at this time. Pt doesn't have any current sxs of withdrawal from opiates.

## 2017-09-12 NOTE — BEHAVIORAL HEALTH ASSESSMENT NOTE - NSBHSOCIALHXDETAILSFT_PSY_A_CORE
Single, no children, living with brother in private residence on 2nd floor of two-family house; parents live downstairs. Works 2 jobs. Substance use history as above.

## 2017-09-13 ENCOUNTER — TRANSCRIPTION ENCOUNTER (OUTPATIENT)
Age: 37
End: 2017-09-13

## 2017-09-13 VITALS
OXYGEN SATURATION: 97 % | RESPIRATION RATE: 20 BRPM | DIASTOLIC BLOOD PRESSURE: 78 MMHG | TEMPERATURE: 98 F | SYSTOLIC BLOOD PRESSURE: 149 MMHG | HEART RATE: 65 BPM

## 2017-09-13 DIAGNOSIS — Z02.9 ENCOUNTER FOR ADMINISTRATIVE EXAMINATIONS, UNSPECIFIED: ICD-10-CM

## 2017-09-13 PROBLEM — Z00.00 ENCOUNTER FOR PREVENTIVE HEALTH EXAMINATION: Status: ACTIVE | Noted: 2017-09-13

## 2017-09-13 PROBLEM — F19.90 OTHER PSYCHOACTIVE SUBSTANCE USE, UNSPECIFIED, UNCOMPLICATED: Chronic | Status: ACTIVE | Noted: 2017-09-10

## 2017-09-13 LAB
ANION GAP SERPL CALC-SCNC: 17 MMOL/L — SIGNIFICANT CHANGE UP (ref 5–17)
B BURGDOR DNA SPEC QL NAA+PROBE: NEGATIVE — SIGNIFICANT CHANGE UP
BUN SERPL-MCNC: 5 MG/DL — LOW (ref 7–23)
CALCIUM SERPL-MCNC: 9.1 MG/DL — SIGNIFICANT CHANGE UP (ref 8.4–10.5)
CHLORIDE SERPL-SCNC: 106 MMOL/L — SIGNIFICANT CHANGE UP (ref 96–108)
CO2 SERPL-SCNC: 23 MMOL/L — SIGNIFICANT CHANGE UP (ref 22–31)
CREAT SERPL-MCNC: 0.65 MG/DL — SIGNIFICANT CHANGE UP (ref 0.5–1.3)
CRYPTOC AG CSF-ACNC: NEGATIVE — SIGNIFICANT CHANGE UP
GLUCOSE SERPL-MCNC: 127 MG/DL — HIGH (ref 70–99)
HCT VFR BLD CALC: 39.9 % — SIGNIFICANT CHANGE UP (ref 39–50)
HGB BLD-MCNC: 13.3 G/DL — SIGNIFICANT CHANGE UP (ref 13–17)
MCHC RBC-ENTMCNC: 29 PG — SIGNIFICANT CHANGE UP (ref 27–34)
MCHC RBC-ENTMCNC: 33.3 GM/DL — SIGNIFICANT CHANGE UP (ref 32–36)
MCV RBC AUTO: 86.9 FL — SIGNIFICANT CHANGE UP (ref 80–100)
PLATELET # BLD AUTO: 180 K/UL — SIGNIFICANT CHANGE UP (ref 150–400)
POTASSIUM SERPL-MCNC: 3.5 MMOL/L — SIGNIFICANT CHANGE UP (ref 3.5–5.3)
POTASSIUM SERPL-SCNC: 3.5 MMOL/L — SIGNIFICANT CHANGE UP (ref 3.5–5.3)
RBC # BLD: 4.59 M/UL — SIGNIFICANT CHANGE UP (ref 4.2–5.8)
RBC # FLD: 13.6 % — SIGNIFICANT CHANGE UP (ref 10.3–14.5)
SODIUM SERPL-SCNC: 146 MMOL/L — HIGH (ref 135–145)
VDRL CSF-TITR: NEGATIVE — SIGNIFICANT CHANGE UP
WBC # BLD: 7.15 K/UL — SIGNIFICANT CHANGE UP (ref 3.8–10.5)
WBC # FLD AUTO: 7.15 K/UL — SIGNIFICANT CHANGE UP (ref 3.8–10.5)
WNV IGG CSF IA-ACNC: NEGATIVE — SIGNIFICANT CHANGE UP
WNV IGG CSF IA-ACNC: NEGATIVE — SIGNIFICANT CHANGE UP

## 2017-09-13 PROCEDURE — 81001 URINALYSIS AUTO W/SCOPE: CPT

## 2017-09-13 PROCEDURE — 82330 ASSAY OF CALCIUM: CPT

## 2017-09-13 PROCEDURE — 83615 LACTATE (LD) (LDH) ENZYME: CPT

## 2017-09-13 PROCEDURE — 84100 ASSAY OF PHOSPHORUS: CPT

## 2017-09-13 PROCEDURE — 87102 FUNGUS ISOLATION CULTURE: CPT

## 2017-09-13 PROCEDURE — 85027 COMPLETE CBC AUTOMATED: CPT

## 2017-09-13 PROCEDURE — 87389 HIV-1 AG W/HIV-1&-2 AB AG IA: CPT

## 2017-09-13 PROCEDURE — 80307 DRUG TEST PRSMV CHEM ANLYZR: CPT

## 2017-09-13 PROCEDURE — 82435 ASSAY OF BLOOD CHLORIDE: CPT

## 2017-09-13 PROCEDURE — 86788 WEST NILE VIRUS AB IGM: CPT

## 2017-09-13 PROCEDURE — 82947 ASSAY GLUCOSE BLOOD QUANT: CPT

## 2017-09-13 PROCEDURE — 86403 PARTICLE AGGLUT ANTBDY SCRN: CPT

## 2017-09-13 PROCEDURE — 70450 CT HEAD/BRAIN W/O DYE: CPT

## 2017-09-13 PROCEDURE — 86592 SYPHILIS TEST NON-TREP QUAL: CPT

## 2017-09-13 PROCEDURE — 84295 ASSAY OF SERUM SODIUM: CPT

## 2017-09-13 PROCEDURE — 99232 SBSQ HOSP IP/OBS MODERATE 35: CPT

## 2017-09-13 PROCEDURE — 85610 PROTHROMBIN TIME: CPT

## 2017-09-13 PROCEDURE — 84157 ASSAY OF PROTEIN OTHER: CPT

## 2017-09-13 PROCEDURE — 82565 ASSAY OF CREATININE: CPT

## 2017-09-13 PROCEDURE — 87116 MYCOBACTERIA CULTURE: CPT

## 2017-09-13 PROCEDURE — 96374 THER/PROPH/DIAG INJ IV PUSH: CPT

## 2017-09-13 PROCEDURE — 87205 SMEAR GRAM STAIN: CPT

## 2017-09-13 PROCEDURE — 80048 BASIC METABOLIC PNL TOTAL CA: CPT

## 2017-09-13 PROCEDURE — 87040 BLOOD CULTURE FOR BACTERIA: CPT

## 2017-09-13 PROCEDURE — 87529 HSV DNA AMP PROBE: CPT

## 2017-09-13 PROCEDURE — 85014 HEMATOCRIT: CPT

## 2017-09-13 PROCEDURE — 82945 GLUCOSE OTHER FLUID: CPT

## 2017-09-13 PROCEDURE — 87070 CULTURE OTHR SPECIMN AEROBIC: CPT

## 2017-09-13 PROCEDURE — 93005 ELECTROCARDIOGRAM TRACING: CPT

## 2017-09-13 PROCEDURE — 96375 TX/PRO/DX INJ NEW DRUG ADDON: CPT

## 2017-09-13 PROCEDURE — 83735 ASSAY OF MAGNESIUM: CPT

## 2017-09-13 PROCEDURE — 84132 ASSAY OF SERUM POTASSIUM: CPT

## 2017-09-13 PROCEDURE — 86789 WEST NILE VIRUS ANTIBODY: CPT

## 2017-09-13 PROCEDURE — 99285 EMERGENCY DEPT VISIT HI MDM: CPT | Mod: 25

## 2017-09-13 PROCEDURE — 89051 BODY FLUID CELL COUNT: CPT

## 2017-09-13 PROCEDURE — 82550 ASSAY OF CK (CPK): CPT

## 2017-09-13 PROCEDURE — 77003 FLUOROGUIDE FOR SPINE INJECT: CPT

## 2017-09-13 PROCEDURE — 85730 THROMBOPLASTIN TIME PARTIAL: CPT

## 2017-09-13 PROCEDURE — 80053 COMPREHEN METABOLIC PANEL: CPT

## 2017-09-13 PROCEDURE — 87476 LYME DIS DNA AMP PROBE: CPT

## 2017-09-13 PROCEDURE — 83605 ASSAY OF LACTIC ACID: CPT

## 2017-09-13 PROCEDURE — 86703 HIV-1/HIV-2 1 RESULT ANTBDY: CPT

## 2017-09-13 PROCEDURE — 82803 BLOOD GASES ANY COMBINATION: CPT

## 2017-09-13 PROCEDURE — 62270 DX LMBR SPI PNXR: CPT

## 2017-09-13 PROCEDURE — 71046 X-RAY EXAM CHEST 2 VIEWS: CPT

## 2017-09-13 PROCEDURE — 99239 HOSP IP/OBS DSCHRG MGMT >30: CPT

## 2017-09-13 RX ADMIN — Medication 271 MILLIGRAM(S): at 05:23

## 2017-09-13 NOTE — PROGRESS NOTE ADULT - ATTENDING COMMENTS
More awake and alert. s/p LP no headache. No sign of meningitis. Await pending lab. If CSF herpes PCR is negative d/c acyclovir.  Likely cause of encephalopathy - related to substance abuse. Patient indicated that he took heroin and new substance a day prior.   Psych eval.
38 Y/O/M, h/o substance abuse admitted because of altered mental state/ acute encephalopathy and found to have low grade fever. To rule out meningitis, failed attempt for LP. Continue empiric Abx and planned for LP by IR. Consider ID. Monitor mental state closely and CIWA. Follow up on blood culture.
Patient seen and examined. feels much improved. awake alert oriented. No sign of withdrawal. No diarrhea  or abdominal cramp, no rhinorrhea.  HD stable, afebrile . No sign and symptoms suggestive of CNS infection. Psych consult appreciated.   Advise patient to follow in clinic in 1-2 week and follow up with the pending CSF lab ( probability of having +ve test is Low)   D/C planning   T 35 minutes.

## 2017-09-13 NOTE — PROGRESS NOTE ADULT - ASSESSMENT
37y old  Male who presents with a chief complaint hallucinations, AMS for several hours c/b low-grade fever in ED, found to have positive screen for opiates and THC, likely 2/2 drug use vs. meningitis.
Imp/Rx:  He does not appear to have an active infection in the CNS.  Likely that the ms changes are related to illicit substance use.  Monitor off abx/anti virals.  f/u WNV testing.    ID to see prn.  check HIV test if not yet performed.
The CSF looks uniniflammed/uninfected, but I would like to see the results of the HSV PCR before we stop the acyclovir.  This is far less consistent with meningitis/infection.    Monitor Cr.
37y old  Male who presents with a chief complaint hallucinations, AMS for several hours c/b low-grade fever in ED, found to have positive screen for opiates and THC, likely 2/2 drug use vs. meningitis.
37y old  Male who presents with a chief complaint hallucinations, AMS for several hours c/b low-grade fever in ED, found to have positive screen for opiates and THC, likely 2/2 drug use.

## 2017-09-13 NOTE — DISCHARGE NOTE ADULT - PLAN OF CARE
Follow up care You were hospitalized for altered mental status, likely secondary to acute drug intoxication. Please be sure to follow up with the resources provided to you for substance abuse counseling by our  within the next week. Please abstain from illegal substance use. If you begin experiencing hallucinations, urges to hurt yourself, or urges to hurt others, please seek emergency medical attention immediately. You were hospitalized for altered mental status, likely secondary to acute drug intoxication. Please be sure to follow up with the resources provided to you for substance abuse counseling by our  within the next week. Please abstain from illegal substance use. If you begin experiencing hallucinations, urges to hurt yourself, or urges to hurt others, please seek emergency medical attention immediately. Please follow up with our clinic doctor (information above), as you have no primary medical doctor, within two weeks. You were hospitalized for altered mental status, likely secondary to acute drug intoxication. Please be sure to follow up with the resources provided to you for substance abuse counseling by our  within the next week. Please abstain from illegal substance use. If you begin experiencing hallucinations, urges to hurt yourself, or urges to hurt others, please seek emergency medical attention immediately. Please follow up with our clinic doctor (information above), as you have no primary medical doctor, within two weeks. Please also follow up with your PMD for results of the rest of the viral panels from your lumbar puncture such as West Nile virus as per ID. You were hospitalized for altered mental status, likely secondary to acute drug intoxication. Please be sure to follow up with the resources provided to you for substance abuse counseling by our  within the next week. Please abstain from illegal substance use. If you begin experiencing hallucinations, urges to hurt yourself, or urges to hurt others, please seek emergency medical attention immediately. Please follow up with our clinic doctor (information above), as you have no primary medical doctor, within two weeks.   -You have an appointment at [address] [place] [address] [facility] on Sept 28th at 2:30pm with Dr. Chico Gotti.  Please also follow up with the doctor for results of the rest of the viral panels from your lumbar puncture such as West Nile virus as per ID. If you have any further questions or concerns can call [phone].

## 2017-09-13 NOTE — DISCHARGE NOTE ADULT - HOSPITAL COURSE
36 yo M w/PMHx polysubstance dependency including heroin (last used 1/2 bag prior to admission), marijuana, "pain pills," presenting with altered mental status 36 yo M w/PMHx polysubstance dependency including heroin (last used 1/2 bag prior to admission), marijuana, "pain pills," presenting with altered mental status for several hours. As per his mother, pt had been at a family event when he began slurring his speech and complaining of visual hallucinations. He was brought to the ED for further evaluation. LP was attempted in the ED twice with no success. He was administered ceftriaxone, vancomycin, and ampicillin. He was also not 36 yo M w/PMHx polysubstance dependency including heroin (last used 1/2 bag prior to admission), marijuana, "pain pills," presenting with altered mental status for several hours. As per his mother, pt had been at a family event when he began slurring his speech and complaining of visual hallucinations. He was brought to the ED for further evaluation. Low grade fever was observed in the ED to 37.8 so LP was attempted in the ED twice with no success. He was administered ceftriaxone, vancomycin, and ampicillin as well as acyclovir. Blood and urine cultures, and HIV testing was also sent, and returned negative.    Pt was placed on a symptom-triggered CIWA and transferred to the floors. LP was performed with interventional radiology, non-contributory for meningitis (see full results below). Antibiotics were DC'd. Psychiatry was consulted, and recommended CINA for opiate withdrawal. Pt was also seen by social work, and provided with resources for substance abuse counseling, although he declined making an appointment at that time. On 9/13, it was determined he was stable for discharge, and he was discharged home. At the time of discharge, the patient was hemodynamically stable, was tolerating PO diet, was voiding urine and passing stool, was ambulating, and was comfortable with adequate pain control. The patient was instructed to follow up with the clinic, as he had no PMD, within 1-2 weeks after discharge from the hospital.      Consults:  ID  IR  Psychiatry  Social Work 38 yo M w/PMHx polysubstance dependency including heroin (last used 1/2 bag prior to admission), marijuana, "pain pills," presenting with altered mental status for several hours. As per his mother, pt had been at a family event when he began slurring his speech and complaining of visual hallucinations. He was brought to the ED for further evaluation. Low grade fever was observed in the ED to 37.8 so LP was attempted in the ED twice with no success. He was administered ceftriaxone, vancomycin, and ampicillin as well as acyclovir. Blood and urine cultures, and HIV testing was also sent, and returned negative. Patient shoulf follow up outpatient for west nile serolgies. patient cleared by ID for discharge.    Pt was placed on a symptom-triggered CIWA and transferred to the floors. LP was performed with interventional radiology, non-contributory for meningitis (see full results below). Antibiotics were DC'd. Psychiatry was consulted, and recommended CINA for opiate withdrawal. Pt was also seen by social work, and provided with resources for substance abuse counseling, although he declined making an appointment at that time. On 9/13, it was determined he was stable for discharge, and he was discharged home. At the time of discharge, the patient was hemodynamically stable, was tolerating PO diet, was voiding urine and passing stool, was ambulating, and was comfortable with adequate pain control. The patient was instructed to follow up with the clinic, as he had no PMD, within 1-2 weeks after discharge from the hospital.      Consults:  ID  IR  Psychiatry  Social Work

## 2017-09-13 NOTE — DISCHARGE NOTE ADULT - CARE PROVIDER_API CALL
Aleksandr Kelly), Internal Medicine  [address] [place] [address]  Phone: [phone]  Fax: [phone] melanie giordano  [address]  Phone: [phone]  Fax: (   )    -

## 2017-09-13 NOTE — DISCHARGE NOTE ADULT - OTHER SIGNIFICANT FINDINGS
9/11 LP analysis:  Culture - CSF with Gram Stain . (09.11.17 @ 22:07)    Gram Stain:   No polymorphonuclear cells seen  No organisms seen by cytocentrifuge    Specimen Source: .CSF CSF    CSF Lymphocytes: 71 % (09.11.17 @ 18:54)  CSF Monocytes/Macrophages: 29 % (09.11.17 @ 18:54)  Total Nucleated Cell Count, CSF: 3 /uL (09.11.17 @ 18:54)  CSF Color: No Color (09.11.17 @ 18:54)  Lactate Dehydrogenase, CSF (09.11.17 @ 18:49)    Lactate Dehydrogenase, CSF: 26: Reference Ranges have NOT been established for CSF LDH.  The  has not determined the efficacy of this test when  performed on CSF specimens. The performance characteristics of this test  were determined by [facility]. U/L  Protein, CSF: 38 mg/dL (09.11.17 @ 18:49)  Glucose, CSF: 81 mg/dL (09.11.17 @ 18:49)  HSV 1/2 PCR: not detected    urine tox screen:  opiates: DETECTED  THC: DETECTED

## 2017-09-13 NOTE — PROGRESS NOTE ADULT - PROBLEM SELECTOR PLAN 1
-LP preliminary results non concerning for meningitis - will f/u HSV  -f/u BCX and UCX - negative so far  -appreciate continued ID recs  -IR consult for LP under sedation  -c/w CIWA with symptom triggered ativan prn, Hx substance abuse with positive opioids and THC, EtOH neg  -psych consult f/u  -will DC 1:1 given improved mental status
-LP preliminary results non concerning for meningitis, HSV negative  -DC'd acyclovir  -f/u BCX and UCX - negative so far  -appreciate continued ID recs  -IR consult for LP under sedation  -c/w CIWA with symptom triggered ativan prn, Hx substance abuse with positive opioids and THC, EtOH neg
-failed LP, 2 attempts, currently on empiric Rx for meningitis since unclear cause of his acute encephalopathy; however, given one episode of low-grade fever, no sick contacts, likely 2/2 opiate/drug use  -f/u BCX  -will consult ID  -IR consult for LP under sedation  -c/w CIWA with symptom triggered ativan prn, Hx substance abuse with positive opioids and THC, EtOH neg  -psych consult once mental status improves  -unable to reach mother this AM, will try again this afternoon

## 2017-09-13 NOTE — DISCHARGE NOTE ADULT - CARE PLAN
Principal Discharge DX:	Altered mental state  Goal:	Follow up care  Instructions for follow-up, activity and diet:	You were hospitalized for altered mental status, likely secondary to acute drug intoxication. Please be sure to follow up with the resources provided to you for substance abuse counseling by our  within the next week. Please abstain from illegal substance use. If you begin experiencing hallucinations, urges to hurt yourself, or urges to hurt others, please seek emergency medical attention immediately. Principal Discharge DX:	Altered mental state  Goal:	Follow up care  Instructions for follow-up, activity and diet:	You were hospitalized for altered mental status, likely secondary to acute drug intoxication. Please be sure to follow up with the resources provided to you for substance abuse counseling by our  within the next week. Please abstain from illegal substance use. If you begin experiencing hallucinations, urges to hurt yourself, or urges to hurt others, please seek emergency medical attention immediately. Please follow up with our clinic doctor (information above), as you have no primary medical doctor, within two weeks. Principal Discharge DX:	Altered mental state  Goal:	Follow up care  Instructions for follow-up, activity and diet:	You were hospitalized for altered mental status, likely secondary to acute drug intoxication. Please be sure to follow up with the resources provided to you for substance abuse counseling by our  within the next week. Please abstain from illegal substance use. If you begin experiencing hallucinations, urges to hurt yourself, or urges to hurt others, please seek emergency medical attention immediately. Please follow up with our clinic doctor (information above), as you have no primary medical doctor, within two weeks. Please also follow up with your PMD for results of the rest of the viral panels from your lumbar puncture such as West Nile virus as per ID. Principal Discharge DX:	Altered mental state  Goal:	Follow up care  Instructions for follow-up, activity and diet:	You were hospitalized for altered mental status, likely secondary to acute drug intoxication. Please be sure to follow up with the resources provided to you for substance abuse counseling by our  within the next week. Please abstain from illegal substance use. If you begin experiencing hallucinations, urges to hurt yourself, or urges to hurt others, please seek emergency medical attention immediately. Please follow up with our clinic doctor (information above), as you have no primary medical doctor, within two weeks.   -You have an appointment at [address] [place] [address] [facility] on Sept 28th at 2:30pm with Dr. Chico Gotti.  Please also follow up with the doctor for results of the rest of the viral panels from your lumbar puncture such as West Nile virus as per ID. If you have any further questions or concerns can call [phone].

## 2017-09-13 NOTE — PROGRESS NOTE ADULT - SUBJECTIVE AND OBJECTIVE BOX
Contact info:  Samantha Mandujano MD  Internal Medicine, PGY1  Pager: [phone] (NS)/[phone] (CHANDU)    M-F 7AM-7PM: pager covered by primary day team  Academic conferences 8AM-9AM and 12PM-1PM: please page only if urgent or consultant  Mon-Sun 7PM-7AM: Night float page [phone] for teams 1-3, [phone] for teams 4, CMA, CMB  -Sun 7AM-12PM: please see contact sheet in front of chart, primary day team  -Sun 12PM-7PM: Page [phone] for teams 1-3 (NS), [facility] please page covering resident    24 HOUR EVENTS/ROS:    MEDICATIONS:                  PHYSICAL EXAM:  T(C): 36.9 (09-13-17 @ 04:47), Max: 37.2 (09-12-17 @ 11:36)  HR: 61 (09-13-17 @ 04:47) (60 - 85)  BP: 136/84 (09-13-17 @ 04:47) (134/83 - 147/78)  RR: 18 (09-13-17 @ 04:47) (18 - 20)  SpO2: 98% (09-13-17 @ 04:47) (97% - 98%)  Wt(kg): --  Daily     Daily   I&O's Summary    12 Sep 2017 07:01  -  13 Sep 2017 07:00  --------------------------------------------------------  IN: 2430 mL / OUT: 600 mL / NET: 1830 mL      TELEMETRY:     Appearance: NAD	  HEENT:   Normal oral mucosa, PERRL, EOMI	  Lymphatic: No lymphadenopathy  Cardiovascular: Normal S1 S2, No JVD, No murmurs, No edema  Respiratory: Lungs clear to auscultation	  Psychiatry: A & O x 3, Mood & affect appropriate  Gastrointestinal:  Soft, Non-tender, + BS	  Skin: No rashes, No ecchymoses, No cyanosis	  Neurologic: Non-focal  Extremities: Normal range of motion, No clubbing, cyanosis or edema  Vascular: Peripheral pulses palpable 2+ bilaterally    LABS:	 	                        13.3   7.15  )-----------( 180      ( 13 Sep 2017 07:56 )             39.9     09-13    146<H>  |  106  |  5<L>  ----------------------------<  127<H>  3.5   |  23  |  0.65  09-12    144  |  106  |  5<L>  ----------------------------<  106<H>  3.4<L>   |  21<L>  |  0.68    Ca    9.1      13 Sep 2017 07:47  Ca    8.8      12 Sep 2017 07:42      proBNP:   Lipid Profile:   HgA1c:   TSH:   FS: CAPILLARY BLOOD GLUCOSE        BCX/UCX:       CARDIAC MARKERS:            	    ECG:  	  RADIOLOGY:    Consult notes reviewed:

## 2017-09-13 NOTE — PROGRESS NOTE ADULT - SUBJECTIVE AND OBJECTIVE BOX
INFECTIOUS DISEASES FOLLOW UP--Pierce Fall MD  Pager [phone]    This is a follow up note for this  37y Male with  Altered mental status  CSF HSV study by PCR is neg.  off all antimicrobials  feels better    Further ROS:  CONSTITUTIONAL:  No fever  CARDIOVASCULAR:  No chest pain or palpitations  RESPIRATORY:  No dyspnea  GASTROINTESTINAL:  No nausea, vomiting, diarrhea, or abdominal pain  GENITOURINARY:  No dysuria  NEUROLOGIC:  No headache,     Allergies  No Known Allergies    ANTIBIOTICS/RELEVANT:  antimicrobials  off now    Objective:  Vital Signs Last 24 Hrs  T(C): 36.9 (13 Sep 2017 04:47), Max: 36.9 (13 Sep 2017 04:47)  T(F): 98.5 (13 Sep 2017 04:47), Max: 98.5 (13 Sep 2017 04:47)  HR: 61 (13 Sep 2017 04:47) (60 - 85)  BP: 136/84 (13 Sep 2017 04:47) (134/83 - 147/78)  BP(mean): --  RR: 18 (13 Sep 2017 04:47) (18 - 20)  SpO2: 98% (13 Sep 2017 04:47) (97% - 98%)    PHYSICAL EXAM:  Constitutional:no acute distress--a bit tremulous  Eyes:SYLWIA, EOMI  Ear/Nose/Throat: no oral lesions, 	  Respiratory: clear BL  Cardiovascular: S1S2  Gastrointestinal:soft, (+) BS, no tenderness  Extremities:no e/e/c  No Lymphadenopathy  IV sites not inflammed.    LABS:                        13.3   7.15  )-----------( 180      ( 13 Sep 2017 07:56 )             39.9     09-13    146<H>  |  106  |  5<L>  ----------------------------<  127<H>  3.5   |  23  |  0.65    Ca    9.1      13 Sep 2017 07:47    MICROBIOLOGY:  cultures neg....WNV studies pending

## 2017-09-13 NOTE — DISCHARGE NOTE ADULT - CARE PROVIDERS DIRECT ADDRESSES
[email or web address]

## 2017-09-13 NOTE — DISCHARGE NOTE ADULT - PROVIDER TOKENS
TOKEN:'[email or web address]' FREE:[LAST:[giuliana],FIRST:[melanie],PHONE:[phone]],FAX:[(   )    -],ADDRESS:[[address] [place] [address]]

## 2017-09-13 NOTE — DISCHARGE NOTE ADULT - PATIENT PORTAL LINK FT
“You can access the FollowHealth Patient Portal, offered by [facility], by registering with the following website: [email or web address]”

## 2017-09-13 NOTE — PROGRESS NOTE ADULT - PROBLEM SELECTOR PLAN 2
-f/u psych consult
-psych recommends methadone for agitation and CINA for opiate withdrawal
-will consult psych and SW once mental status improves

## 2017-09-13 NOTE — PROGRESS NOTE ADULT - SUBJECTIVE AND OBJECTIVE BOX
Contact info:  Samantha Mandujano MD  Internal Medicine, PGY1  Pager: [phone] (NS)/[phone] (CHANDU)    M-F 7AM-7PM: pager covered by primary day team  Academic conferences 8AM-9AM and 12PM-1PM: please page only if urgent or consultant  Mon-Sun 7PM-7AM: Night float page [phone] for teams 1-3, [phone] for teams 4, CMA, CMB  -Sun 7AM-12PM: please see contact sheet in front of chart, primary day team  -Sun 12PM-7PM: Page [phone] for teams 1-3 (NS), [facility] please page covering resident    24 HOUR EVENTS/ROS:    MEDICATIONS:                  PHYSICAL EXAM:  T(C): 36.9 (09-13-17 @ 04:47), Max: 37.2 (09-12-17 @ 11:36)  HR: 61 (09-13-17 @ 04:47) (60 - 85)  BP: 136/84 (09-13-17 @ 04:47) (134/83 - 147/78)  RR: 18 (09-13-17 @ 04:47) (18 - 20)  SpO2: 98% (09-13-17 @ 04:47) (97% - 98%)  Wt(kg): --  Daily     Daily   I&O's Summary    12 Sep 2017 07:01  -  13 Sep 2017 07:00  --------------------------------------------------------  IN: 2430 mL / OUT: 600 mL / NET: 1830 mL      TELEMETRY:     Appearance: NAD	  HEENT:   Normal oral mucosa, PERRL, EOMI	  Lymphatic: No lymphadenopathy  Cardiovascular: Normal S1 S2, No JVD, No murmurs, No edema  Respiratory: Lungs clear to auscultation	  Psychiatry: A & O x 3, Mood & affect appropriate  Gastrointestinal:  Soft, Non-tender, + BS	  Skin: No rashes, No ecchymoses, No cyanosis	  Neurologic: Non-focal  Extremities: Normal range of motion, No clubbing, cyanosis or edema  Vascular: Peripheral pulses palpable 2+ bilaterally    LABS:	 	                        13.3   7.15  )-----------( 180      ( 13 Sep 2017 07:56 )             39.9     09-13    146<H>  |  106  |  5<L>  ----------------------------<  127<H>  3.5   |  23  |  0.65  09-12    144  |  106  |  5<L>  ----------------------------<  106<H>  3.4<L>   |  21<L>  |  0.68    Ca    9.1      13 Sep 2017 07:47  Ca    8.8      12 Sep 2017 07:42      proBNP:   Lipid Profile:   HgA1c:   TSH:   FS: CAPILLARY BLOOD GLUCOSE        BCX/UCX:       CARDIAC MARKERS:            	    ECG:  	  RADIOLOGY:    Consult notes reviewed: Contact info:  Samantha Mandujano MD  Internal Medicine, PGY1  Pager: [phone] (NS)/[phone] (CHANDU)    M-F 7AM-7PM: pager covered by primary day team  Academic conferences 8AM-9AM and 12PM-1PM: please page only if urgent or consultant  Mon-Sun 7PM-7AM: Night float page [phone] for teams 1-3, [phone] for teams 4, CMA, CMB  -Sun 7AM-12PM: please see contact sheet in front of chart, primary day team  Sa-Sun 12PM-7PM: Page [phone] for teams 1-3 (NS), [facility] please page covering resident    24 HOUR EVENTS/ROS: No acute events overnight. Denies headaches, F/C, dizziness, syncope, CP/SOB, abdominal pain, dysuria, peripheral swelling, skin changes, new joint aches. Still complains of mild diarrhea, nausea.    MEDICATIONS: -    PHYSICAL EXAM:  T(C): 36.9 (09-13-17 @ 04:47), Max: 37.2 (09-12-17 @ 11:36)  HR: 61 (09-13-17 @ 04:47) (60 - 85)  BP: 136/84 (09-13-17 @ 04:47) (134/83 - 147/78)  RR: 18 (09-13-17 @ 04:47) (18 - 20)  SpO2: 98% (09-13-17 @ 04:47) (97% - 98%)  Wt(kg): --  Daily     Daily   I&O's Summary    12 Sep 2017 07:01  -  13 Sep 2017 07:00  --------------------------------------------------------  IN: 2430 mL / OUT: 600 mL / NET: 1830 mL    Appearance: young white male lying in bed in NAD  HEENT:  Normal oral mucosa, PERRL, EOMI, no nystagmus	  Lymphatic: No lymphadenopathy  Cardiovascular: Normal S1 S2, No JVD, No murmurs, No edema  Respiratory: Lungs clear to auscultation	  Gastrointestinal:  Soft, Non-tender, + BS	  Skin: No rashes, No ecchymoses, No cyanosis	  Neurologic/psychiatric: Non-focal, CN 2-12 grossly intact, alert and oriented, mood and affect appropriate, mild left-sided intention tremor UE  MSK/Extremities: No nuchal rigidity, No clubbing, cyanosis or edema  Vascular: Peripheral pulses palpable 2+ bilaterally    LABS:	 	                        13.3   7.15  )-----------( 180      ( 13 Sep 2017 07:56 )             39.9     09-13    146<H>  |  106  |  5<L>  ----------------------------<  127<H>  3.5   |  23  |  0.65  09-12    144  |  106  |  5<L>  ----------------------------<  106<H>  3.4<L>   |  21<L>  |  0.68    Ca    9.1      13 Sep 2017 07:47  Ca    8.8      12 Sep 2017 07:42    FS: CAPILLARY BLOOD GLUCOSE    Consult notes reviewed:  psychiatry, ID

## 2017-09-14 LAB
CULTURE RESULTS: SIGNIFICANT CHANGE UP
SPECIMEN SOURCE: SIGNIFICANT CHANGE UP
WNV AB SPEC QL: SIGNIFICANT CHANGE UP
WNV IGG TITR FLD: NEGATIVE — SIGNIFICANT CHANGE UP
WNV IGM SPEC QL: NEGATIVE — SIGNIFICANT CHANGE UP

## 2017-09-16 LAB
CULTURE RESULTS: SIGNIFICANT CHANGE UP
CULTURE RESULTS: SIGNIFICANT CHANGE UP
SPECIMEN SOURCE: SIGNIFICANT CHANGE UP
SPECIMEN SOURCE: SIGNIFICANT CHANGE UP

## 2017-09-28 ENCOUNTER — APPOINTMENT (OUTPATIENT)
Dept: INTERNAL MEDICINE | Facility: CLINIC | Age: 37
End: 2017-09-28
Payer: MEDICAID

## 2017-09-28 ENCOUNTER — OUTPATIENT (OUTPATIENT)
Dept: OUTPATIENT SERVICES | Facility: HOSPITAL | Age: 37
LOS: 1 days | End: 2017-09-28
Payer: MEDICAID

## 2017-09-28 VITALS
BODY MASS INDEX: 31.32 KG/M2 | HEIGHT: 74 IN | SYSTOLIC BLOOD PRESSURE: 143 MMHG | WEIGHT: 244 LBS | OXYGEN SATURATION: 98 % | DIASTOLIC BLOOD PRESSURE: 90 MMHG | HEART RATE: 104 BPM

## 2017-09-28 DIAGNOSIS — Z86.59 PERSONAL HISTORY OF OTHER MENTAL AND BEHAVIORAL DISORDERS: ICD-10-CM

## 2017-09-28 DIAGNOSIS — Z82.49 FAMILY HISTORY OF ISCHEMIC HEART DISEASE AND OTHER DISEASES OF THE CIRCULATORY SYSTEM: ICD-10-CM

## 2017-09-28 DIAGNOSIS — F11.23 OPIOID DEPENDENCE WITH WITHDRAWAL: ICD-10-CM

## 2017-09-28 DIAGNOSIS — F17.200 NICOTINE DEPENDENCE, UNSPECIFIED, UNCOMPLICATED: ICD-10-CM

## 2017-09-28 DIAGNOSIS — Z78.9 OTHER SPECIFIED HEALTH STATUS: ICD-10-CM

## 2017-09-28 DIAGNOSIS — Z56.0 UNEMPLOYMENT, UNSPECIFIED: ICD-10-CM

## 2017-09-28 PROCEDURE — 99204 OFFICE O/P NEW MOD 45 MIN: CPT | Mod: GC

## 2017-09-28 PROCEDURE — G0463: CPT

## 2017-09-28 SDOH — ECONOMIC STABILITY - INCOME SECURITY: UNEMPLOYMENT, UNSPECIFIED: Z56.0

## 2017-09-29 DIAGNOSIS — I10 ESSENTIAL (PRIMARY) HYPERTENSION: ICD-10-CM

## 2017-10-06 DIAGNOSIS — Z86.59 PERSONAL HISTORY OF OTHER MENTAL AND BEHAVIORAL DISORDERS: ICD-10-CM

## 2017-10-06 DIAGNOSIS — F11.23 OPIOID DEPENDENCE WITH WITHDRAWAL: ICD-10-CM

## 2017-10-06 DIAGNOSIS — Z72.0 TOBACCO USE: ICD-10-CM

## 2017-10-06 DIAGNOSIS — Z71.6 TOBACCO ABUSE COUNSELING: ICD-10-CM

## 2017-10-06 DIAGNOSIS — Z78.9 OTHER SPECIFIED HEALTH STATUS: ICD-10-CM

## 2017-10-06 DIAGNOSIS — F17.200 NICOTINE DEPENDENCE, UNSPECIFIED, UNCOMPLICATED: ICD-10-CM

## 2017-10-06 DIAGNOSIS — F19.10 OTHER PSYCHOACTIVE SUBSTANCE ABUSE, UNCOMPLICATED: ICD-10-CM

## 2017-10-06 DIAGNOSIS — Z82.49 FAMILY HISTORY OF ISCHEMIC HEART DISEASE AND OTHER DISEASES OF THE CIRCULATORY SYSTEM: ICD-10-CM

## 2017-10-06 DIAGNOSIS — Z56.0 UNEMPLOYMENT, UNSPECIFIED: ICD-10-CM

## 2017-10-06 DIAGNOSIS — Z13.89 ENCOUNTER FOR SCREENING FOR OTHER DISORDER: ICD-10-CM

## 2017-10-06 SDOH — ECONOMIC STABILITY - INCOME SECURITY: UNEMPLOYMENT, UNSPECIFIED: Z56.0

## 2017-10-11 LAB
CULTURE RESULTS: SIGNIFICANT CHANGE UP
SPECIMEN SOURCE: SIGNIFICANT CHANGE UP

## 2017-10-30 ENCOUNTER — APPOINTMENT (OUTPATIENT)
Dept: INTERNAL MEDICINE | Facility: CLINIC | Age: 37
End: 2017-10-30

## 2017-10-31 ENCOUNTER — OUTPATIENT (OUTPATIENT)
Dept: OUTPATIENT SERVICES | Facility: HOSPITAL | Age: 37
LOS: 1 days | End: 2017-10-31
Payer: MEDICAID

## 2017-10-31 ENCOUNTER — LABORATORY RESULT (OUTPATIENT)
Age: 37
End: 2017-10-31

## 2017-10-31 ENCOUNTER — APPOINTMENT (OUTPATIENT)
Dept: INTERNAL MEDICINE | Facility: CLINIC | Age: 37
End: 2017-10-31
Payer: MEDICAID

## 2017-10-31 VITALS
BODY MASS INDEX: 35.55 KG/M2 | HEART RATE: 62 BPM | WEIGHT: 277 LBS | HEIGHT: 74 IN | OXYGEN SATURATION: 98 % | DIASTOLIC BLOOD PRESSURE: 80 MMHG | SYSTOLIC BLOOD PRESSURE: 130 MMHG

## 2017-10-31 DIAGNOSIS — F19.10 OTHER PSYCHOACTIVE SUBSTANCE ABUSE, UNCOMPLICATED: ICD-10-CM

## 2017-10-31 DIAGNOSIS — I10 ESSENTIAL (PRIMARY) HYPERTENSION: ICD-10-CM

## 2017-10-31 PROCEDURE — G0463: CPT

## 2017-10-31 PROCEDURE — 99213 OFFICE O/P EST LOW 20 MIN: CPT | Mod: GE

## 2017-10-31 PROCEDURE — 80074 ACUTE HEPATITIS PANEL: CPT

## 2017-10-31 PROCEDURE — G0396 ALCOHOL/SUBS INTERV 15-30MN: CPT | Mod: 25

## 2017-11-01 LAB
CULTURE RESULTS: SIGNIFICANT CHANGE UP
HAV IGM SER-ACNC: SIGNIFICANT CHANGE UP
HBV CORE IGM SER-ACNC: SIGNIFICANT CHANGE UP
HBV SURFACE AG SER-ACNC: SIGNIFICANT CHANGE UP
HCV AB S/CO SERPL IA: 0.09 S/CO — SIGNIFICANT CHANGE UP
HCV AB SERPL-IMP: SIGNIFICANT CHANGE UP
SPECIMEN SOURCE: SIGNIFICANT CHANGE UP

## 2017-11-02 LAB
CHOLEST SERPL-MCNC: 178 MG/DL
CHOLEST/HDLC SERPL: 2.6 RATIO
HBA1C MFR BLD HPLC: 5 %
HBV SURFACE AB SER QL: NONREACTIVE
HDLC SERPL-MCNC: 69 MG/DL
HIV1+2 AB SPEC QL IA.RAPID: NONREACTIVE
LDLC SERPL CALC-MCNC: 95 MG/DL
TRIGL SERPL-MCNC: 72 MG/DL

## 2017-11-07 DIAGNOSIS — Z13.89 ENCOUNTER FOR SCREENING FOR OTHER DISORDER: ICD-10-CM

## 2017-11-07 DIAGNOSIS — F19.10 OTHER PSYCHOACTIVE SUBSTANCE ABUSE, UNCOMPLICATED: ICD-10-CM

## 2018-01-08 ENCOUNTER — APPOINTMENT (OUTPATIENT)
Dept: INTERNAL MEDICINE | Facility: CLINIC | Age: 38
End: 2018-01-08
Payer: MEDICAID

## 2018-01-08 ENCOUNTER — OUTPATIENT (OUTPATIENT)
Dept: OUTPATIENT SERVICES | Facility: HOSPITAL | Age: 38
LOS: 1 days | End: 2018-01-08
Payer: MEDICAID

## 2018-01-08 VITALS
BODY MASS INDEX: 34.52 KG/M2 | WEIGHT: 269 LBS | OXYGEN SATURATION: 97 % | HEIGHT: 74 IN | SYSTOLIC BLOOD PRESSURE: 132 MMHG | DIASTOLIC BLOOD PRESSURE: 70 MMHG | HEART RATE: 84 BPM

## 2018-01-08 DIAGNOSIS — I10 ESSENTIAL (PRIMARY) HYPERTENSION: ICD-10-CM

## 2018-01-08 PROCEDURE — G0463: CPT

## 2018-01-08 PROCEDURE — 99408 AUDIT/DAST 15-30 MIN: CPT

## 2018-01-08 PROCEDURE — 90688 IIV4 VACCINE SPLT 0.5 ML IM: CPT

## 2018-01-08 PROCEDURE — G0008: CPT

## 2018-01-08 PROCEDURE — 90715 TDAP VACCINE 7 YRS/> IM: CPT

## 2018-01-08 PROCEDURE — 99213 OFFICE O/P EST LOW 20 MIN: CPT | Mod: GE

## 2018-01-08 PROCEDURE — 90471 IMMUNIZATION ADMIN: CPT

## 2018-01-12 DIAGNOSIS — Z72.0 TOBACCO USE: ICD-10-CM

## 2018-01-12 DIAGNOSIS — Z23 ENCOUNTER FOR IMMUNIZATION: ICD-10-CM

## 2018-01-12 DIAGNOSIS — Z13.89 ENCOUNTER FOR SCREENING FOR OTHER DISORDER: ICD-10-CM

## 2018-01-12 DIAGNOSIS — F41.9 ANXIETY DISORDER, UNSPECIFIED: ICD-10-CM

## 2018-01-12 DIAGNOSIS — Z71.6 TOBACCO ABUSE COUNSELING: ICD-10-CM

## 2018-03-19 ENCOUNTER — APPOINTMENT (OUTPATIENT)
Dept: INTERNAL MEDICINE | Facility: CLINIC | Age: 38
End: 2018-03-19

## 2018-06-26 ENCOUNTER — EMERGENCY (EMERGENCY)
Facility: HOSPITAL | Age: 38
LOS: 1 days | Discharge: HOME | End: 2018-06-26
Admitting: EMERGENCY MEDICINE
Payer: MEDICAID

## 2018-06-26 VITALS
HEART RATE: 69 BPM | SYSTOLIC BLOOD PRESSURE: 115 MMHG | OXYGEN SATURATION: 100 % | TEMPERATURE: 98 F | DIASTOLIC BLOOD PRESSURE: 57 MMHG | RESPIRATION RATE: 15 BRPM

## 2018-06-26 VITALS
DIASTOLIC BLOOD PRESSURE: 78 MMHG | RESPIRATION RATE: 16 BRPM | SYSTOLIC BLOOD PRESSURE: 139 MMHG | HEART RATE: 86 BPM | OXYGEN SATURATION: 99 %

## 2018-06-26 DIAGNOSIS — F13.20 SEDATIVE, HYPNOTIC OR ANXIOLYTIC DEPENDENCE, UNCOMPLICATED: ICD-10-CM

## 2018-06-26 DIAGNOSIS — F11.90 OPIOID USE, UNSPECIFIED, UNCOMPLICATED: ICD-10-CM

## 2018-06-26 DIAGNOSIS — F32.9 MAJOR DEPRESSIVE DISORDER, SINGLE EPISODE, UNSPECIFIED: ICD-10-CM

## 2018-06-26 DIAGNOSIS — F32.2 MAJOR DEPRESSIVE DISORDER, SINGLE EPISODE, SEVERE WITHOUT PSYCHOTIC FEATURES: ICD-10-CM

## 2018-06-26 LAB
ALBUMIN SERPL ELPH-MCNC: 5 G/DL — SIGNIFICANT CHANGE UP (ref 3.3–5)
ALP SERPL-CCNC: 34 U/L — LOW (ref 40–120)
ALT FLD-CCNC: 46 U/L — HIGH (ref 4–41)
AMPHET UR-MCNC: NEGATIVE — SIGNIFICANT CHANGE UP
APAP SERPL-MCNC: < 15 UG/ML — LOW (ref 15–25)
APPEARANCE UR: CLEAR — SIGNIFICANT CHANGE UP
AST SERPL-CCNC: 48 U/L — HIGH (ref 4–40)
BACTERIA # UR AUTO: SIGNIFICANT CHANGE UP
BARBITURATES UR SCN-MCNC: NEGATIVE — SIGNIFICANT CHANGE UP
BASOPHILS # BLD AUTO: 0.06 K/UL — SIGNIFICANT CHANGE UP (ref 0–0.2)
BASOPHILS NFR BLD AUTO: 0.9 % — SIGNIFICANT CHANGE UP (ref 0–2)
BENZODIAZ UR-MCNC: POSITIVE — SIGNIFICANT CHANGE UP
BILIRUB SERPL-MCNC: 0.8 MG/DL — SIGNIFICANT CHANGE UP (ref 0.2–1.2)
BILIRUB UR-MCNC: NEGATIVE — SIGNIFICANT CHANGE UP
BLOOD UR QL VISUAL: NEGATIVE — SIGNIFICANT CHANGE UP
BUN SERPL-MCNC: 16 MG/DL — SIGNIFICANT CHANGE UP (ref 7–23)
CALCIUM SERPL-MCNC: 9.4 MG/DL — SIGNIFICANT CHANGE UP (ref 8.4–10.5)
CANNABINOIDS UR-MCNC: NEGATIVE — SIGNIFICANT CHANGE UP
CHLORIDE SERPL-SCNC: 102 MMOL/L — SIGNIFICANT CHANGE UP (ref 98–107)
CO2 SERPL-SCNC: 22 MMOL/L — SIGNIFICANT CHANGE UP (ref 22–31)
COCAINE METAB.OTHER UR-MCNC: POSITIVE — SIGNIFICANT CHANGE UP
COLOR SPEC: YELLOW — SIGNIFICANT CHANGE UP
CREAT SERPL-MCNC: 1.03 MG/DL — SIGNIFICANT CHANGE UP (ref 0.5–1.3)
EOSINOPHIL # BLD AUTO: 0.2 K/UL — SIGNIFICANT CHANGE UP (ref 0–0.5)
EOSINOPHIL NFR BLD AUTO: 2.8 % — SIGNIFICANT CHANGE UP (ref 0–6)
ETHANOL BLD-MCNC: < 10 MG/DL — SIGNIFICANT CHANGE UP
GLUCOSE SERPL-MCNC: 99 MG/DL — SIGNIFICANT CHANGE UP (ref 70–99)
GLUCOSE UR-MCNC: NEGATIVE — SIGNIFICANT CHANGE UP
GRAN CASTS # UR COMP ASSIST: SIGNIFICANT CHANGE UP
HCT VFR BLD CALC: 48.8 % — SIGNIFICANT CHANGE UP (ref 39–50)
HGB BLD-MCNC: 15.6 G/DL — SIGNIFICANT CHANGE UP (ref 13–17)
IMM GRANULOCYTES # BLD AUTO: 0.03 # — SIGNIFICANT CHANGE UP
IMM GRANULOCYTES NFR BLD AUTO: 0.4 % — SIGNIFICANT CHANGE UP (ref 0–1.5)
KETONES UR-MCNC: NEGATIVE — SIGNIFICANT CHANGE UP
LEUKOCYTE ESTERASE UR-ACNC: NEGATIVE — SIGNIFICANT CHANGE UP
LYMPHOCYTES # BLD AUTO: 1.57 K/UL — SIGNIFICANT CHANGE UP (ref 1–3.3)
LYMPHOCYTES # BLD AUTO: 22.3 % — SIGNIFICANT CHANGE UP (ref 13–44)
MCHC RBC-ENTMCNC: 29.8 PG — SIGNIFICANT CHANGE UP (ref 27–34)
MCHC RBC-ENTMCNC: 32 % — SIGNIFICANT CHANGE UP (ref 32–36)
MCV RBC AUTO: 93.1 FL — SIGNIFICANT CHANGE UP (ref 80–100)
METHADONE UR-MCNC: NEGATIVE — SIGNIFICANT CHANGE UP
MONOCYTES # BLD AUTO: 0.71 K/UL — SIGNIFICANT CHANGE UP (ref 0–0.9)
MONOCYTES NFR BLD AUTO: 10.1 % — SIGNIFICANT CHANGE UP (ref 2–14)
MUCOUS THREADS # UR AUTO: SIGNIFICANT CHANGE UP
NEUTROPHILS # BLD AUTO: 4.47 K/UL — SIGNIFICANT CHANGE UP (ref 1.8–7.4)
NEUTROPHILS NFR BLD AUTO: 63.5 % — SIGNIFICANT CHANGE UP (ref 43–77)
NITRITE UR-MCNC: NEGATIVE — SIGNIFICANT CHANGE UP
NRBC # FLD: 0 — SIGNIFICANT CHANGE UP
OPIATES UR-MCNC: POSITIVE — SIGNIFICANT CHANGE UP
OXYCODONE UR-MCNC: POSITIVE — HIGH
PCP UR-MCNC: NEGATIVE — SIGNIFICANT CHANGE UP
PH UR: 6 — SIGNIFICANT CHANGE UP (ref 4.6–8)
PLATELET # BLD AUTO: 192 K/UL — SIGNIFICANT CHANGE UP (ref 150–400)
PMV BLD: 10.9 FL — SIGNIFICANT CHANGE UP (ref 7–13)
POTASSIUM SERPL-MCNC: 5.1 MMOL/L — SIGNIFICANT CHANGE UP (ref 3.5–5.3)
POTASSIUM SERPL-SCNC: 5.1 MMOL/L — SIGNIFICANT CHANGE UP (ref 3.5–5.3)
PROT SERPL-MCNC: 8.4 G/DL — HIGH (ref 6–8.3)
PROT UR-MCNC: 20 MG/DL — SIGNIFICANT CHANGE UP
RBC # BLD: 5.24 M/UL — SIGNIFICANT CHANGE UP (ref 4.2–5.8)
RBC # FLD: 12.5 % — SIGNIFICANT CHANGE UP (ref 10.3–14.5)
RBC CASTS # UR COMP ASSIST: SIGNIFICANT CHANGE UP (ref 0–?)
SALICYLATES SERPL-MCNC: < 5 MG/DL — LOW (ref 15–30)
SODIUM SERPL-SCNC: 141 MMOL/L — SIGNIFICANT CHANGE UP (ref 135–145)
SP GR SPEC: 1.03 — SIGNIFICANT CHANGE UP (ref 1–1.04)
SQUAMOUS # UR AUTO: SIGNIFICANT CHANGE UP
TSH SERPL-MCNC: 0.72 UIU/ML — SIGNIFICANT CHANGE UP (ref 0.27–4.2)
UROBILINOGEN FLD QL: NORMAL MG/DL — SIGNIFICANT CHANGE UP
WBC # BLD: 7.04 K/UL — SIGNIFICANT CHANGE UP (ref 3.8–10.5)
WBC # FLD AUTO: 7.04 K/UL — SIGNIFICANT CHANGE UP (ref 3.8–10.5)
WBC UR QL: SIGNIFICANT CHANGE UP (ref 0–?)

## 2018-06-26 PROCEDURE — 99285 EMERGENCY DEPT VISIT HI MDM: CPT

## 2018-06-26 PROCEDURE — 90792 PSYCH DIAG EVAL W/MED SRVCS: CPT | Mod: GC

## 2018-06-26 NOTE — ED PROVIDER NOTE - CARE PLAN
Principal Discharge DX:	Major depressive disorder, severe  Secondary Diagnosis:	Opiate misuse  Secondary Diagnosis:	Xanax use disorder, moderate, dependence

## 2018-06-26 NOTE — ED BEHAVIORAL HEALTH NOTE - BEHAVIORAL HEALTH NOTE
Patient reassessed at ~6:45p. While in ED, he was calm, cooperative, and remained in good behavioral control. He did not exhibit signs of withdrawal. He consistently denied suicidal ideation with plan/intent and said that he just wants to be there to help his family. He agreed to follow up with appointment at [facility] tomorrow 6/27/2018 at 9:00a. Psychoeducation provided about suicidality, withdrawal and patient expressed understanding. He agreed that if he experienced suicidality, was in acute risk of harm to self or others, or experiencing withdrawal, he should call 911 or go to the nearest ED for assistance.

## 2018-06-26 NOTE — ED ADULT NURSE NOTE - OBJECTIVE STATEMENT
Patient received in  c/o depression and SI, pt endorsed making a passive suicidal statement to his brother whom pt does not get along with, pt endorsing depressive symptoms in , denies active SI. denies HI&AH. safety and comfort measures maintained. psych eval ongoing Patient received in  c/o depression and SI, pt endorsed making a passive suicidal statement to his brother whom pt does not get along with, pt endorsing depressive symptoms in , denies active SI. denies HI&AH. safety and comfort measures maintained. psych eval ongoing  Received patient to low acuity  . Pt is alert and oriented times three. Pt spoke with family on phone. Pt spoke with  and is waiting disposition. Pt has been told by his brother of his fathers death and is very tearful. At the moment he is asleep and calm.   GIO Womack  Patient is discharged with instuctions to an appointment tomorrow for rehab and is waiting to be picked up by car service. Escorted to door and instructed on where to sit. Pt states he will not go anywhere else except [facility] car seervice is to pick him up.    GIO Womack

## 2018-06-26 NOTE — ED BEHAVIORAL HEALTH ASSESSMENT NOTE - OTHER PAST PSYCHIATRIC HISTORY (INCLUDE DETAILS REGARDING ONSET, COURSE OF ILLNESS, INPATIENT/OUTPATIENT TREATMENT)
Psychiatric history of depression since 2012, no inpatient psychiatric hospitalizations, no longer engaged in outpatient psychiatric care (previously in treatment with Dr. Mata (sp?) x1 month on Cymbalta and Gabapentin), no prior SIB/SA

## 2018-06-26 NOTE — ED BEHAVIORAL HEALTH ASSESSMENT NOTE - CASE SUMMARY
37 year old single  male, no dependents, employed, domiciled, no medical history; psychiatric history of depression since 2012, no history of inpatient psychiatric hospitalizations, no outpatient psychiatric care, no prior SIB/SA; substance history significant for xanax, opiate abuse, outpatient rehab; no significant violence/arrest/legal problems was BIBEMS activated by the brother when he made a suicidal statement.  During interview, patient exhibited no acute signs of withdrawal and at the time of evaluation was clinically sober with no signs of intoxication.  At this time, patient does not present as psychotic or manic, and denies acute SI/HI.  At approximately 3:45p, family called patient and told him about his father's death and was reassessed following this news as well.  While patient appeared dysphoric, appropriately tearful, he was able to contract for safety in the behavioral health assessment area.  He consistently denied suicidal ideation with no intent/plan/gestures noted.  He was able to contract for safety and agreed to return to the ED or call 911 if he is in acute danger to self or others, or experiencing withdrawal.  Patient declined inpatient psychiatric and inpatient substance abuse treatment (rehab/detox) that was offered to him and has capacity to do so at this time.  He stated that he wants to be there for his family due to the passing of his father on this day.  Patient did not meet criteria for involuntary psychiatric hospitalization as per mental health hygiene law.  Patient was provided with a comprehensive list of outpatient resources, including information for the Crisis Clinic. He has a next day appointment at [facility] for 6/27/2018 at 9:00a.  See  note as completed by KALEB for referral information provided.  Patient with no acute symptoms of intoxication or withdrawal, which is reviewed, advised to return to ER if should experience acute withdrawal symptoms.

## 2018-06-26 NOTE — ED BEHAVIORAL HEALTH ASSESSMENT NOTE - DIFFERENTIAL
Major depressive disorder, severe, without psychotic features  Benzodiazepine use disorder  Opiate use disorder Major depressive disorder, severe, without psychotic features  Substance induced mood disorder  Benzodiazepine use disorder  Opiate use disorder

## 2018-06-26 NOTE — ED BEHAVIORAL HEALTH ASSESSMENT NOTE - DESCRIPTION (FIRST USE, LAST USE, QUANTITY, FREQUENCY, DURATION)
Smokes 1 ppd Drinks 3 beers once per week Takes 1 pull every 3 months Snorted heroin (last used September 2017, last used 6-8 bags per day), takes oxycodone/percocet/vicodin Xanax 6-8 bars (12-16 mg) per day Snorted heroin (last used September 2017, last used 6-8 bags per day), no IVDU, takes oxycodone/percocet/vicodin

## 2018-06-26 NOTE — ED PROVIDER NOTE - OBJECTIVE STATEMENT
This is a 37 year old Male PMHX  BIBIA for psych eval r/t suicidal ideations. Per EMS activated by a friend with [employer] that patient was suicidal with a plan to shoot himself in the head. Ems report no access to a gun. Noncompliant with Cymbalta and Gabapentin since February. No past psych history ,hospitalizations or SI attempts.  EMS reports his father is currently at [place] and in final stages of life. On arrival patient is depressed, calm and cooperative. Reports obtaining xanax and oxycodone thru [facility]. Reports past heroin abuse in which he self detoxed in January.

## 2018-06-26 NOTE — ED BEHAVIORAL HEALTH NOTE - BEHAVIORAL HEALTH NOTE
spoke with the patient regarding OP JERI treatment. He agreed to attend treatment at the [facility].  called that program and spoke with Luisa Mcelroy LCSW, who provided an appointment for Thursday, 7/5/18 at 1:00PM.  then received a call from and Karley Welsh, PhD, who provided an earlier appointment for tomorrow, Wednesday, 6/27/18 at 9:00AM. If he cannot make tomorrow's appointment, he still may keep the later appointment. Dr. Welsh offered her phone number, [phone], to be provided to the patient. Dr. Welsh stated her intention to urge him to go to [facility] for detox treatment.  provided printed material regarding the [facility] program and the appointment, as well as the crisis clinic, and comprehensive listings of available substance abuse services.  spoke with the patient regarding OP JERI treatment. He agreed to attend treatment at the [facility] program of [facility].  called that program and spoke with Luisa Mcelroy LCSW, who provided an appointment for Thursday, 7/5/18 at 1:00PM.  then received a call from and Karley Welsh, PhD, who provided an earlier appointment for tomorrow, Wednesday, 6/27/18 at 9:00AM. If he cannot make tomorrow's appointment, he still may keep the later appointment. Dr. Welsh offered her phone number, [phone], to be provided to the patient. Dr. Welsh stated her intention to urge him to go to [facility] for detox treatment.  provided printed material regarding the [facility] program and the appointment, as well as the crisis clinic, and comprehensive listings of available substance abuse services.     called [facility], [phone], and spoke with Marianela, who provided invoice #[id number], for [facility] service, as requested by the evaluating psychiatrists, to be provided by [employer], [phone], to transport him to his home, verified to be:  spoke with the patient regarding OP JERI treatment. He agreed to attend treatment at the [facility] program of [facility].  called that program and spoke with Luisa Mcelroy LCSW, who provided an appointment for Thursday, 7/5/18 at 1:00PM.  then received a call from and Karley Welsh, PhD, who provided an earlier appointment for tomorrow, Wednesday, 6/27/18 at 9:00AM. If he cannot make tomorrow's appointment, he still may keep the later appointment. Dr. Welsh offered her phone number, [phone], to be provided to the patient. Dr. Welsh stated her intention to urge him to go to [facility] for detox treatment.  provided printed material regarding the [facility] program and the appointment, as well as the crisis clinic, and comprehensive listings of available substance abuse services.     called [facility], [phone], and spoke with Marianela, who provided invoice #[id number], for [facility] service, as requested by the evaluating psychiatrists, to be provided by [employer], [phone], to transport him to his home, verified to be: [id number], [place] [address]. An ETA was not provided.

## 2018-06-26 NOTE — ED BEHAVIORAL HEALTH ASSESSMENT NOTE - DESCRIPTION
None single, no dependents, employed as manager of a [employer], domiciled in [place] with mother, father, 39 year old brother Patient calm, cooperative in ED.    Vital Signs Last 24 Hrs  T(C): --  T(F): --  HR: 86 (26 Jun 2018 13:14) (86 - 86)  BP: 139/78 (26 Jun 2018 13:14) (139/78 - 139/78)  BP(mean): --  RR: 16 (26 Jun 2018 13:14) (16 - 16)  SpO2: 99% (26 Jun 2018 13:14) (99% - 99%)
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
## 2018-06-26 NOTE — ED BEHAVIORAL HEALTH ASSESSMENT NOTE - SUICIDE PROTECTIVE FACTORS
Engaged in work or school/Identifies reasons for living/Responsibility to family and others/Future oriented/Supportive social network or family

## 2018-06-26 NOTE — ED PROVIDER NOTE - MEDICAL DECISION MAKING DETAILS
This is a 37 year old Male PMHX  BIBIA for psych eval r/t suicidal ideations. Per EMS activated by a friend with [employer] that patient was suicidal with a plan to shoot himself in the head. Medical evaluation performed. There is no clinical evidence of intoxication or any acute medical problem requiring immediate intervention. Final disposition will be determined by psychiatrist.

## 2018-06-26 NOTE — ED BEHAVIORAL HEALTH ASSESSMENT NOTE - NS ED [facility] MED ROS NEUROLOGICAL
SLP Plan of Care Note  SwallowInitial Evaluation    Diagnosis:   1. Numbness around mouth        Risk of Aspiration:  Risk for Aspiration: Minimal    Below is key subjective and objective information from the last 24 hours.  For further details and goals, please refer to the Speech Swallow flowsheets.    Subjective:  Subjective Comments: Patient reports current facial numbness, not difficulty swallowing at this time. (12/11/17 1724)     Objective:  See below for current functional status overview.  See Speech flowsheets for full details regarding the speech therapy provided.    Education:   On this date, the patient and patient's spouse was educated on role of SLP.    The response to education was: Verbalizes understanding and Demonstrates understanding.    Assessment:  Swallow evaluation completed.  Patient reports no trouble swallowing at this time, however she had significant swallow difficulty several weeks ago when numbness occurred in posterior portion of oral cavity.  No overt s/sx of aspiration observed nor difficulties with oral phase.  Patient's speech does appear effortful and at times, slurred.    Patient's swallow within functional limits despite reports of numbness.  SLP will follow up with patient tomorrow once diagnostic reports are completed to determine if further speech services are appropriate for speech changes.    Recommendations: None    SLP Identified Barriers to Discharge: None  Recommendations for Discharge: SLP: Home    Goals:  Swallow Goal 1:  Swallowing Discharge Goal 1: Patient will tolerate regular texture solids and thin liquids with no overt s/sx of aspiration  Swallow Goal 1 Progress:       Plan:     Frequency: 1x follow up for education/possibly oral motor exercises     Total Treatment Time:  SLP Time Spent: 13 min (12/11/17 1724)   No complaints

## 2018-06-26 NOTE — ED BEHAVIORAL HEALTH ASSESSMENT NOTE - HPI (INCLUDE ILLNESS QUALITY, SEVERITY, DURATION, TIMING, CONTEXT, MODIFYING FACTORS, ASSOCIATED SIGNS AND SYMPTOMS)
Per collateral from brother Oscar Spring ([phone]): Over the last few days, patient has been on a "nicholson." The brother suspects that he has been using more substances - he said that the patient has been in possession of Oxycontin, Xanax, marijuana, alcohol. He had been returning home agitated, angry, hostile, verbally abusive. He has threatened to kill himself, saying "I'm this close to shooting myself." Last night, the patient's mother and girlfriend had tried to take him to the ED for psychiatric evaluation, but he jumped out of the car and walked all the way from [facility] to [place], [place]. To the brother's knowledge, patient has never attempted suicide. He said the patient has never endorsed violent ideation and the worst altercation was just a fight between themselves. Patient may have 1-2 arrests for cannabis possession. The patient has had a decades long history of heroin use.  in past od on heroin 9 months ago 4-5 days. 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 Mata (sp?) x1 month on Cymbalta and Gabapentin), no prior SIB/SA; substance history significant for xanax, opiate abuse, outpatient rehab; no significant violence/arrest/legal problems was BIBEMS activated by the brother when he made a suicidal statement.     Patient was calm, cooperative, in no acute distress in the ED. He affect was depressed, constricted. He had low voice and psychomotor slowing. He was otherwise linear, goal-directed in thought with logical thought process. Patient said that the other evening, his mother and girlfriend had tricked him into going to [facility] ED by saying that they were going to visit his father, who had been in the hospital for weeks due to renal failure and heart failure. He said that he had taken a bar of Xanax (2 mg) before going with them, but denies other substance use. When he noticed that they were going in through a different entrance, he said that he got out of the vehicle and walked 4 miles back to his home in [place].     Per collateral from brother Oscar Spring ([phone]): Over the last few days, patient has been on a "nicholson." The brother suspects that he has been using more substances - he said that the patient has been in possession of Oxycontin, Xanax, marijuana, alcohol. He had been returning home agitated, angry, hostile, verbally abusive. He has threatened to kill himself, saying "I'm this close to shooting myself." Last night, the patient's mother and girlfriend had tried to take him to the ED for psychiatric evaluation, but he jumped out of the car and walked all the way from [facility] to [place], [place]. To the brother's knowledge, patient has never attempted suicide. He said the patient has never endorsed violent ideation and the worst altercation was just a fight between themselves. Patient may have 1-2 arrests for cannabis possession. The patient has had a decades long history of heroin use and had an overdose 9 months ago which necessitated a 4-5 day hospital stay. 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 Mata (sp?) x1 month on Cymbalta and Gabapentin), no prior SIB/SA; substance history significant for xanax, opiate abuse, outpatient rehab; no significant violence/arrest/legal problems was BIBEMS activated by the brother when he made a suicidal statement.     Patient was calm, cooperative, in no acute distress in the ED. He affect was depressed, constricted. He had low voice and psychomotor slowing. He was otherwise linear, goal-directed with logical thought process. Patient said that the other evening, his mother and girlfriend had tricked him into going to [facility] ED by saying that they were going to visit his father, who had been in the hospital for weeks due to renal failure and heart failure. He said that he had taken a bar of Xanax (2 mg) before going with them, but denies other substance use. When he noticed that they were going in through a different entrance, he said that he got out of the vehicle and walked 4 hours back to his home in [place].     On day of presentation, patient said that he went to a bar in the afternoon. After having 3 beers and 3 whiskys, he returned home and got into an argument with his brother. He said that his mother made a statement that made him feel blamed and he responded by saying that he might as well kill himself. He cannot remember exactly what he said since he was so upset. He denied prior SI/SA, access to guns. He said that there are many things that he enjoys doing in life (hanging out with friends, having coffee, etc.) and he does not want to die.     Patient endorses >2 weeks of persistently depressed mood, anhedonia, guilty ruminations/feelings of worthlessness, decreased energy, decreased concentration, decreased appetite, psychomotor slowing, sleep disturbances (sleeps for 5 hours per night, but wakes up every hour). He denies past/recent history of manic symptoms outside of substance use. He denied AH/VH/HI/delusional content.      Per collateral from brother Oscar Spring ([phone]): Over the last few days, patient has been on a "nicholson." The brother suspects that he has been using more substances - he said that the patient has been in possession of Oxycontin, Xanax, marijuana, alcohol. He had been returning home agitated, angry, hostile, verbally abusive. He has threatened to kill himself, saying "I'm this close to shooting myself." Last night, the patient's mother and girlfriend had tried to take him to the ED for psychiatric evaluation, but he jumped out of the car and walked all the way from [facility] to [place], [place]. To the brother's knowledge, patient has never attempted suicide. He said the patient has never endorsed violent ideation and the worst altercation was just a fight between themselves. Patient may have 1-2 arrests for cannabis possession. The patient has had a decades long history of heroin use and had an overdose 9 months ago which necessitated a 4-5 day hospital stay. 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 Mata (sp?) x1 month on Cymbalta and Gabapentin), no prior SIB/SA; substance history significant for xanax, opiate abuse, outpatient rehab; no significant violence/arrest/legal problems was BIBEMS activated by the brother when he made a suicidal statement.     Patient was calm, cooperative, in no acute distress in the ED. He affect was depressed, constricted. He had low voice and psychomotor slowing. He was otherwise linear, goal-directed with logical thought process. Patient said that the other evening, his mother and girlfriend had tricked him into going to [facility] ED by saying that they were going to visit his father, who had been in the hospital for weeks due to renal failure and heart failure. He said that he had taken a bar of Xanax (2 mg) before going with them, but denies other substance use. When he noticed that they were going in through a different entrance, he said that he got out of the vehicle and walked 4 hours back to his home in [place].     On day of presentation, patient said that he went to a bar in the afternoon. After having 3 beers and 3 whiskys, he returned home and got into an argument with his brother. He said that his mother made a statement that made him feel blamed and he responded by saying that he might as well kill himself. He cannot remember exactly what he said since he was so upset. He denied prior SI/SA, access to guns. He said that there are many things that he enjoys doing in life (hanging out with friends, having coffee, etc.) and he does not want to die.     Patient endorses >2 weeks of persistently depressed mood, anhedonia, guilty ruminations/feelings of worthlessness, decreased energy, decreased concentration, decreased appetite, psychomotor slowing, sleep disturbances (sleeps for 5 hours per night, but wakes up every hour). He denies past/recent history of manic symptoms outside of substance use. He denied AH/VH/HI/delusional content.      Per collateral from brother Oscar Spring ([phone]): Over the last few days, patient has been on a "nicholson." The brother suspects that he has been using more substances - he said that the patient has been in possession of Oxycontin, Xanax, marijuana, alcohol. He had been returning home agitated, angry, hostile, verbally abusive. He has threatened to kill himself, saying "I'm this close to shooting myself." His brother believes that this was said impulsively in the context of the argument, but is concerned that the patient is in danger given his substance abuse. Last night, the patient's mother and girlfriend had tried to take him to the ED for psychiatric evaluation, but he jumped out of the car and walked all the way from [facility] to [place], [place]. To the brother's knowledge, patient has never attempted suicide. He said the patient has never endorsed violent ideation and the worst altercation was just a fight between themselves. Patient may have 1-2 arrests for cannabis possession. The patient has had a decades long history of heroin use and had an overdose 9 months ago which necessitated a 4-5 day hospital stay. 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 Mata (sp?) x1 month on Cymbalta and Gabapentin), no prior SIB/SA; substance history significant for xanax, opiate abuse, outpatient rehab; no significant violence/arrest/legal problems was BIBEMS activated by the brother when he made a suicidal statement.     Patient was calm, cooperative, in no acute distress in the ED. During interview, patient had no signs of withdrawal. He affect was depressed, constricted. He had low voice and psychomotor slowing. He was otherwise linear, goal-directed with logical thought process. Patient said that the other evening, his mother and girlfriend had tricked him into going to [facility] ED by saying that they were going to visit his father, who had been in the hospital for weeks due to renal failure and heart failure. He said that he had taken a bar of Xanax (2 mg) before going with them, but denies other substance use. When he noticed that they were going in through a different entrance, he said that he got out of the vehicle and walked 4 hours back to his home in [place].     On day of presentation, patient said that he went to a bar in the afternoon. After having 3 beers and 3 whiskys, he returned home and got into an argument with his brother. He said that his mother made a statement that made him feel blamed and he responded by saying that he might as well kill himself. He cannot remember exactly what he said since he was so upset. He denied prior SI/SA, access to guns. He said that there are many things that he enjoys doing in life (hanging out with friends, having coffee, etc.) and he does not want to die.     Patient endorses >2 weeks of persistently depressed mood, anhedonia, guilty ruminations/feelings of worthlessness, decreased energy, decreased concentration, decreased appetite, psychomotor slowing, sleep disturbances (sleeps for 5 hours per night, but wakes up every hour). He denies past/recent history of manic symptoms outside of substance use. He denied AH/VH/HI/delusional content.      Per collateral from brother Oscar Spring ([phone]): Over the last few days, patient has been on a "nicholson." The brother suspects that he has been using more substances - he said that the patient has been in possession of Oxycontin, Xanax, marijuana, alcohol. He had been returning home agitated, angry, hostile, verbally abusive. He has threatened to kill himself, saying "I'm this close to shooting myself." Brother denies that patient has access to guns. His brother believes that this was said impulsively in the context of the argument, but is concerned that the patient is in danger given his substance abuse. Last night, the patient's mother and girlfriend had tried to take him to the ED for psychiatric evaluation, but he jumped out of the car and walked all the way from [facility] to [place], [place]. To the brother's knowledge, patient has never attempted suicide. He said the patient has never endorsed violent ideation and the worst altercation was just a fight between themselves. Patient may have 1-2 arrests for cannabis possession. The patient has had a decades long history of heroin use and had an overdose 9 months ago which necessitated a 4-5 day hospital stay. 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 Mata (sp?) x1 month on Cymbalta and Gabapentin), no prior SIB/SA; substance history significant for xanax, opiate abuse, outpatient rehab; no significant violence/arrest/legal problems was BIBEMS activated by the brother when he made a suicidal statement.     Patient was calm, cooperative, in no acute distress in the ED. During interview, patient had no signs of withdrawal. He affect was depressed, constricted. He had low voice and psychomotor slowing. He was otherwise linear, goal-directed with logical thought process. Patient said that the other evening, his mother and girlfriend had tricked him into going to [facility] ED by saying that they were going to visit his father, who had been in the hospital for weeks due to renal failure and heart failure. He said that he had taken a bar of Xanax (2 mg) before going with them, but denies other substance use. When he noticed that they were going in through a different entrance, he said that he got out of the vehicle and walked 4 hours back to his home in [place].     On day of presentation, patient said that he went to a bar in the afternoon. After having 3 beers and 3 whiskys, he returned home and got into an argument with his brother. He said that his mother made a statement that made him feel blamed and he responded by saying that he might as well kill himself. He cannot remember exactly what he said since he was so upset. He denied prior SI/SA, access to guns. He said that there are many things that he enjoys doing in life (hanging out with friends, having coffee, etc.) and he does not want to die.     Patient endorses >2 weeks of persistently depressed mood, anhedonia, guilty ruminations/feelings of worthlessness, decreased energy, decreased concentration, decreased appetite, psychomotor slowing, sleep disturbances (sleeps for 5 hours per night, but wakes up every hour). He denies past/recent history of manic symptoms outside of substance use. He denied AH/VH/HI/delusional content.      Per collateral from brother Oscar Spring ([phone]): Over the last few days, patient has been on a "nicholson." The brother suspects that he has been using more substances - he said that the patient has been in possession of Oxycontin, Xanax, marijuana, alcohol. He had been returning home agitated, angry, hostile, verbally abusive. During an argument, he said, "I'm this close to shooting myself." Brother denies that patient has access to guns. His brother believes that this was said impulsively in the context of the argument and said that he is more concerned about the patient accidentally overdosing than attempting suicide. Last night, the patient's mother and girlfriend had tried to take him to the ED for psychiatric evaluation, but he jumped out of the car and walked all the way from [facility] to [place], [place]. To the brother's knowledge, patient has never attempted suicide. He said the patient has never endorsed violent ideation and the worst altercation was just a fight between themselves. Patient may have 1-2 arrests for cannabis possession. The patient has had a decades long history of heroin use and had an overdose 9 months ago which necessitated a 4-5 day hospital stay.

## 2018-06-26 NOTE — ED PROVIDER NOTE - PROGRESS NOTE DETAILS
FRANKLIN Brito: patient spoke with family and was informed of father's passing this AM. Ptietn is calm and crying. FRANKLIN Brito: patient spoke with family and was informed of father's passing this AM. Patient is calm and crying. JEFFERY Brito Informed patient discharged with cab service by Dr. Dunham. IAM Hunter aware. patient remain calm and cooperative no distress noted.

## 2018-06-26 NOTE — ED ADULT NURSE REASSESSMENT NOTE - NS ED NURSE REASSESS COMMENT FT1
break coverage RN- pt currently calm, cooperative, vitally stable, Social work at bedside for possible rehab placement, pt in NAD at this time, will continue to monitor- currently denies SI

## 2018-06-26 NOTE — ED BEHAVIORAL HEALTH ASSESSMENT NOTE - SUICIDE RISK FACTORS
Substance abuse/dependence/Perceived burden on family and others/Mood episode/Agitation/severe anxiety/Access to means (pills, firearms, etc.)/History of abuse/trauma/Anhedonia

## 2018-06-26 NOTE — ED ADULT TRIAGE NOTE - CHIEF COMPLAINT QUOTE
Pt brought in by EMS from home complaining of Suicidal thoughts with no plan. Pt denies HI. PMHX depression.

## 2018-06-26 NOTE — ED BEHAVIORAL HEALTH NOTE - BEHAVIORAL HEALTH NOTE
Patient reassessed at ~5p. He remained in good behavioral control and was calm, cooperative throughout ED course. He denied suicidal ideation, stating that he has to "be there for family." He agreed to referral to outpatient psychiatric resources.

## 2018-06-26 NOTE — ED BEHAVIORAL HEALTH ASSESSMENT NOTE - SUMMARY
37 year old white man, single, no dependents, employed as manager of a [employer], domiciled in [place] home with mother, father, 39 year old brother; no medical history; psychiatric history of depression since 2012, no inpatient psychiatric hospitalizations, no longer engaged in outpatient psychiatric care (previously in treatment with Dr. aMta (sp?) x1 month on Cymbalta and Gabapentin), no prior SIB/SA; substance history significant for xanax, opiate abuse, outpatient rehab; no significant violence/arrest/legal problems was BIBEMS activated by the brother when he made a suicidal statement. At this time, patient meets criteria for major depressive episode, severe, without psychotic features, benzodiazepine use disorder, opiate use disorder. He does not present as psychotic or manic, and denies SI/HI. At approximately 3:45p, family called patient and told him about his father's death. Patient appeared dysphoric, tearful but was able to contract for safety in the behavioral health assessment area. Will continue to re-assess. 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 Mata (sp?) x1 month on Cymbalta and Gabapentin), no prior SIB/SA; substance history significant for xanax, opiate abuse, outpatient rehab; no significant violence/arrest/legal problems was BIBEMS activated by the brother when he made a suicidal statement. During interview, patient had no signs of withdrawal. At this time, patient meets criteria for major depressive episode, severe, without psychotic features, benzodiazepine use disorder, opiate use disorder. He does not present as psychotic or manic, and denies SI/HI. Utox was positive for benzodiazepines, opiates, cocaine, and oxycodone. At approximately 3:45p, family called patient and told him about his father's death. Patient appeared dysphoric, tearful but was able to contract for safety in the behavioral health assessment area. He was re-assessed at 5p and consistently denied suicidal ideation with intent/plan. He was able to contract for safety and agreed to return to the ED or call 911 if he is in acute danger to self or others, or experiencing withdrawal. He declined inpatient psychiatric and inpatient substance abuse treatment that was offered to him. He stated that he wants to be there for his family. And he was given a list of outpatient resources, including information for [facility], which he plans to follow up with. 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 Mata (sp?) x1 month on Cymbalta and Gabapentin), no prior SIB/SA; substance history significant for xanax, opiate abuse, outpatient rehab; no significant violence/arrest/legal problems was BIBEMS activated by the brother when he made a suicidal statement. During interview, patient had no signs of withdrawal. At this time, patient meets criteria for major depressive episode, severe, without psychotic features, benzodiazepine use disorder, opiate use disorder. He does not present as psychotic or manic, and denies SI/HI. Utox was positive for benzodiazepines, opiates, cocaine, and oxycodone. At approximately 3:45p, family called patient and told him about his father's death. Patient appeared dysphoric, tearful but was able to contract for safety in the behavioral health assessment area. He was re-assessed at 5p and consistently denied suicidal ideation with intent/plan. He was able to contract for safety and agreed to return to the ED or call 911 if he is in acute danger to self or others, or experiencing withdrawal. He declined inpatient psychiatric and inpatient substance abuse treatment that was offered to him. He stated that he wants to be there for his family. And he was given a list of outpatient resources, including information for a [facility] visit at the [facility], as well as an appointment with [facility] for Thursday, July 5 at 1:00p. 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 Mata (sp?) x1 month on Cymbalta and Gabapentin), no prior SIB/SA; substance history significant for xanax, opiate abuse, outpatient rehab; no significant violence/arrest/legal problems was BIBEMS activated by the brother when he made a suicidal statement. During interview, patient had no signs of withdrawal. At this time, patient meets criteria for major depressive episode, severe, without psychotic features, benzodiazepine use disorder, opiate use disorder. He does not present as psychotic or manic, and denies SI/HI. Utox was positive for benzodiazepines, opiates, cocaine, and oxycodone. At approximately 3:45p, family called patient and told him about his father's death. Patient appeared dysphoric, tearful but was able to contract for safety in the behavioral health assessment area. He was re-assessed at 5p and consistently denied suicidal ideation with intent/plan. He was able to contract for safety and agreed to return to the ED or call 911 if he is in acute danger to self or others, or experiencing withdrawal. He declined inpatient psychiatric and inpatient substance abuse treatment that was offered to him. He stated that he wants to be there for his family. And he was given a list of outpatient resources, including information for the Crisis [facility]. He has a next day appointment at [facility] for 6/27/2018 at 9:00a.

## 2018-06-26 NOTE — ED BEHAVIORAL HEALTH ASSESSMENT NOTE - RISK ASSESSMENT
Risk factors: diagnosis of depression, current episode of depression, non-adherent with outpatient psychiatric care, substance abuse  Protective factors: no prior SIB/SA, no current active SI, no inpatient psychiatric hospitalizations, supportive family, stable domicile, employed, no chronic medical problems, no significant violence/arrest/legal problems, no global insomnia, no psychosis, no suresh

## 2018-06-26 NOTE — ED BEHAVIORAL HEALTH ASSESSMENT NOTE - REFERRAL / APPOINTMENT DETAILS
[facility] information given Next day appointment at [facility] for 6/27/2018 at 9:00a. Crisis Clinic information provided.

## 2018-06-26 NOTE — ED BEHAVIORAL HEALTH NOTE - BEHAVIORAL HEALTH NOTE
At approximately 3:45p, family called patient and told him about his father's death. Patient appeared dysphoric, tearful but was able to contract for safety in the behavioral health assessment area. Will reassess at a later time.

## 2018-06-26 NOTE — ED BEHAVIORAL HEALTH ASSESSMENT NOTE - REASON
Patient received news from family around 3:45p about his father's death. Will reassess patient for SI and ability to contract for safety

## 2018-07-02 ENCOUNTER — OUTPATIENT (OUTPATIENT)
Dept: OUTPATIENT SERVICES | Facility: HOSPITAL | Age: 38
LOS: 1 days | Discharge: HOME | End: 2018-07-02

## 2018-07-06 DIAGNOSIS — F13.20 SEDATIVE, HYPNOTIC OR ANXIOLYTIC DEPENDENCE, UNCOMPLICATED: ICD-10-CM

## 2019-11-21 NOTE — ED PROVIDER NOTE - NS ED MD DISPO ADMIT [facility]
2019 [facility] Clinical Data Registry (for Quality Improvement)     Postoperative nausea/vomiting risk protocol (Adult = 18 yrs and Pediatric 3-17 yrs)- (430 and 463)  General inhalation anesthetic (NOT TIVA) with PONV risk factors: Yes  Provision of anti-emetic therapy with at least 2 different classes of agents: Yes   Patient DID NOT receive anti-emetic therapy and reason is documented in Medical Record:  N/A    Multimodal Pain Management- (AQI59)  Patient undergoing Elective Surgery (i.e. Outpatient, or ASC, or Prescheduled Surgery prior to Hospital Admission): Yes  Use of Multimodal Pain Management, two or more drugs and/or interventions, NOT including systemic opioids: Yes   Exception: Documented allergy to multiple classes of analgesics:  N/A    PACU assessment of acute postoperative pain prior to Anesthesia Care End- Applies to Patients Age = 18- (ABG7)  Initial PACU pain score is which of the following: < 7/10  Patient unable to report pain score: N/A    Post-anesthetic transfer of care checklist/protocol to PACU/ICU- (426 and 427)  Upon conclusion of case, patient transferred to which of the following locations: PACU/Non-ICU  Use of transfer checklist/protocol: Yes  Exclusion: Service Performed in Patient Hospital Room (and thus did not require transfer): N/A    PACU Reintubation- (AQI31)  General anesthesia requiring endotracheal intubation (ETT) along with subsequent extubation in OR or PACU: No  Required reintubation in the PACU: N/A  Extubation was a planned trial documented in the medical record prior to removal of the original airway device: N/A    Unplanned admission to ICU related to anesthesia service up through end of PACU care- (MD51)  Unplanned admission to ICU (not initially anticipated at anesthesia start time): No         
MEDICINE

## 2021-03-11 NOTE — ED PROVIDER NOTE - EYES, MLM
\"When I take a deep breath it feels like I/m breathing  sharp chards of glass in my chest.\"  O2 N/C found off pt;  RA pulse ox is 93% at this time
Clear bilaterally, pupils equal, round and reactive to light.

## 2021-06-28 ENCOUNTER — APPOINTMENT (OUTPATIENT)
Dept: CARDIOLOGY | Facility: CLINIC | Age: 41
End: 2021-06-28
Payer: MEDICAID

## 2021-06-28 ENCOUNTER — NON-APPOINTMENT (OUTPATIENT)
Age: 41
End: 2021-06-28

## 2021-06-28 VITALS
TEMPERATURE: 98.3 F | OXYGEN SATURATION: 98 % | WEIGHT: 315 LBS | BODY MASS INDEX: 46.74 KG/M2 | DIASTOLIC BLOOD PRESSURE: 83 MMHG | HEART RATE: 91 BPM | RESPIRATION RATE: 14 BRPM | SYSTOLIC BLOOD PRESSURE: 168 MMHG

## 2021-06-28 VITALS — SYSTOLIC BLOOD PRESSURE: 140 MMHG | DIASTOLIC BLOOD PRESSURE: 82 MMHG

## 2021-06-28 DIAGNOSIS — F41.9 ANXIETY DISORDER, UNSPECIFIED: ICD-10-CM

## 2021-06-28 DIAGNOSIS — I49.1 ATRIAL PREMATURE DEPOLARIZATION: ICD-10-CM

## 2021-06-28 PROCEDURE — 99204 OFFICE O/P NEW MOD 45 MIN: CPT

## 2021-06-28 PROCEDURE — 93000 ELECTROCARDIOGRAM COMPLETE: CPT

## 2021-06-28 RX ORDER — DULOXETINE HYDROCHLORIDE 20 MG/1
20 CAPSULE, DELAYED RELEASE PELLETS ORAL
Qty: 30 | Refills: 0 | Status: DISCONTINUED | COMMUNITY
Start: 2017-10-30 | End: 2021-06-28

## 2021-06-28 RX ORDER — BUPROPION HYDROCHLORIDE 300 MG/1
300 TABLET, EXTENDED RELEASE ORAL
Qty: 30 | Refills: 0 | Status: ACTIVE | COMMUNITY
Start: 2021-06-09

## 2021-06-28 RX ORDER — METOPROLOL SUCCINATE 25 MG/1
25 TABLET, EXTENDED RELEASE ORAL DAILY
Qty: 90 | Refills: 1 | Status: ACTIVE | COMMUNITY
Start: 2021-06-28 | End: 1900-01-01

## 2021-06-28 RX ORDER — QUETIAPINE FUMARATE 100 MG/1
100 TABLET ORAL
Qty: 60 | Refills: 0 | Status: ACTIVE | COMMUNITY
Start: 2021-06-09

## 2021-06-28 RX ORDER — CLONAZEPAM 2 MG/1
2 TABLET ORAL
Qty: 21 | Refills: 0 | Status: DISCONTINUED | COMMUNITY
Start: 2021-06-07 | End: 2021-06-28

## 2021-06-28 RX ORDER — MIRTAZAPINE 45 MG/1
45 TABLET, FILM COATED ORAL
Qty: 21 | Refills: 0 | Status: DISCONTINUED | COMMUNITY
Start: 2021-06-07 | End: 2021-06-28

## 2021-06-28 RX ORDER — GABAPENTIN 300 MG/1
300 CAPSULE ORAL TWICE DAILY
Qty: 60 | Refills: 0 | Status: DISCONTINUED | OUTPATIENT
Start: 2017-10-30 | End: 2021-06-28

## 2021-06-28 NOTE — DISCUSSION/SUMMARY
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

## 2021-06-28 NOTE — HISTORY OF PRESENT ILLNESS
[FreeTextEntry1] : Miguelangel is a 40-year-old gentleman former opioid abuse, occasional vaping who presents with elevated blood pressure readings. He is in the process of being hired for [employer]. During the physical he was found to have elevated blood pressures x 2. No headaches, blurry vision, dizziness or palpitations.

## 2021-07-06 ENCOUNTER — APPOINTMENT (OUTPATIENT)
Dept: CARDIOLOGY | Facility: CLINIC | Age: 41
End: 2021-07-06
Payer: MEDICAID

## 2021-07-06 DIAGNOSIS — R03.0 ELEVATED BLOOD-PRESSURE READING, W/OUT DIAGNOSIS OF HYPERTENSION: ICD-10-CM

## 2021-07-06 PROCEDURE — 93784 AMBL BP MNTR W/SOFTWARE: CPT

## 2021-07-07 PROBLEM — R03.0 WHITE COAT SYNDROME WITHOUT DIAGNOSIS OF HYPERTENSION: Status: ACTIVE | Noted: 2021-07-07

## 2021-07-12 ENCOUNTER — NON-APPOINTMENT (OUTPATIENT)
Age: 41
End: 2021-07-12

## 2021-08-17 ENCOUNTER — APPOINTMENT (OUTPATIENT)
Dept: CARDIOLOGY | Facility: CLINIC | Age: 41
End: 2021-08-17
Payer: MEDICAID

## 2021-08-17 DIAGNOSIS — R03.0 ELEVATED BLOOD-PRESSURE READING, W/OUT DIAGNOSIS OF HYPERTENSION: ICD-10-CM

## 2021-08-17 DIAGNOSIS — R01.1 CARDIAC MURMUR, UNSPECIFIED: ICD-10-CM

## 2021-08-17 PROCEDURE — 93306 TTE W/DOPPLER COMPLETE: CPT

## 2021-09-14 PROBLEM — R01.1 MURMUR: Status: ACTIVE | Noted: 2021-09-14

## 2021-09-14 PROBLEM — R03.0 BLOOD PRESSURE ELEVATED WITHOUT HISTORY OF HTN: Status: ACTIVE | Noted: 2021-06-28

## 2022-09-22 NOTE — ED ADULT NURSE NOTE - CHIEF COMPLAINT QUOTE
Last Appointment:  Visit date not found  Future Appointments   Date Time Provider Donna Page   9/28/2022  1:00 PM [facility] 9 XRAY RM [facility] RAD [facility] Radiolo   10/3/2022  8:15 AM Agnes Moreno MD [facility] Surg [facility]   10/24/2022  2:40 PM Kasey Heart, APRN - CNP [place] [facility]   10/27/2022  2:40 PM MD Flor Boone [facility] Pt brought in by EMS from home complaining of Suicidal thoughts with no plan. Pt denies HI. PMHX depression.

## 2023-05-03 NOTE — PATIENT PROFILE ADULT. - PROVIDER NOTIFICATION
Chief Complaint   Patient presents with   • STD   • UTI         HPI: Pepito Acharya is a 24 year old male who presents with concerns regarding sexually transmitted illness.  He states he had unprotected intercourse about 1 week ago.  He reports he has pain with urination is well as urethral drainage.  He has not noticed any blood in the urine.  No complaints of fever or chills.  He would like sexually transmitted disease testing as well as rule out a urinary tract infection.      ROS:  No skin ulcerations on the genitalia reported  No nausea or vomiting  No reported swollen lymph glands    Social History     Tobacco Use   Smoking Status Former   • Current packs/day: 0.00   Smokeless Tobacco Never   Vaping Use   Vaping Status Not on file         Visit Vitals  BP (!) 148/70 (BP Location: LUE - Left upper extremity, Patient Position: Sitting, Cuff Size: Regular)   Pulse 96   Temp 97.7 °F (36.5 °C) (Tympanic)   Resp 18   Wt 115.3 kg (254 lb 3.2 oz)   SpO2 98%   BMI 37.54 kg/m²         Physical Exam:    HEENT:  The pupils are equally round and responsive to light and accommodation.  The sclerae are anicteric and the conjunctivae are noninjected.  The extraocular muscles are intact.    ABD: Non-distended.  Soft, nontender.    :  The patient declines exam of the genitalia      LAB/IMAGING: Pending      IMPRESSION:  1. Urethritis      Patient presents with a history of dysuria, urethral drainage after unprotected intercourse.  He requests full testing for sexually transmitted illness, including HIV, verbal consent given.  We will contact him when results are available.  Until then, he is to refrain from sexual activity.  He is provided patient education materials.  He may seek return care as needed.            Declines